# Patient Record
Sex: FEMALE | Race: WHITE | NOT HISPANIC OR LATINO | Employment: FULL TIME | URBAN - METROPOLITAN AREA
[De-identification: names, ages, dates, MRNs, and addresses within clinical notes are randomized per-mention and may not be internally consistent; named-entity substitution may affect disease eponyms.]

---

## 2017-01-03 ENCOUNTER — APPOINTMENT (OUTPATIENT)
Dept: PHYSICAL THERAPY | Facility: REHABILITATION | Age: 62
End: 2017-01-03
Payer: COMMERCIAL

## 2017-01-03 PROCEDURE — 97110 THERAPEUTIC EXERCISES: CPT

## 2017-01-03 PROCEDURE — 97140 MANUAL THERAPY 1/> REGIONS: CPT

## 2017-01-03 PROCEDURE — 97014 ELECTRIC STIMULATION THERAPY: CPT

## 2017-01-03 PROCEDURE — 97161 PT EVAL LOW COMPLEX 20 MIN: CPT

## 2017-01-06 ENCOUNTER — APPOINTMENT (OUTPATIENT)
Dept: PHYSICAL THERAPY | Facility: REHABILITATION | Age: 62
End: 2017-01-06
Payer: COMMERCIAL

## 2017-01-06 PROCEDURE — 97110 THERAPEUTIC EXERCISES: CPT

## 2017-01-06 PROCEDURE — 97140 MANUAL THERAPY 1/> REGIONS: CPT

## 2017-01-09 ENCOUNTER — APPOINTMENT (OUTPATIENT)
Dept: PHYSICAL THERAPY | Facility: REHABILITATION | Age: 62
End: 2017-01-09
Payer: COMMERCIAL

## 2017-01-09 PROCEDURE — 97110 THERAPEUTIC EXERCISES: CPT

## 2017-01-09 PROCEDURE — 97140 MANUAL THERAPY 1/> REGIONS: CPT

## 2017-01-13 ENCOUNTER — APPOINTMENT (OUTPATIENT)
Dept: PHYSICAL THERAPY | Facility: REHABILITATION | Age: 62
End: 2017-01-13
Payer: COMMERCIAL

## 2017-01-13 PROCEDURE — 97140 MANUAL THERAPY 1/> REGIONS: CPT

## 2017-01-13 PROCEDURE — 97110 THERAPEUTIC EXERCISES: CPT

## 2017-01-16 ENCOUNTER — APPOINTMENT (OUTPATIENT)
Dept: PHYSICAL THERAPY | Facility: REHABILITATION | Age: 62
End: 2017-01-16
Payer: COMMERCIAL

## 2017-01-16 PROCEDURE — 97110 THERAPEUTIC EXERCISES: CPT

## 2017-01-16 PROCEDURE — 97140 MANUAL THERAPY 1/> REGIONS: CPT

## 2017-01-18 ENCOUNTER — APPOINTMENT (OUTPATIENT)
Dept: PHYSICAL THERAPY | Facility: REHABILITATION | Age: 62
End: 2017-01-18
Payer: COMMERCIAL

## 2017-01-18 PROCEDURE — 97110 THERAPEUTIC EXERCISES: CPT

## 2017-01-18 PROCEDURE — 97140 MANUAL THERAPY 1/> REGIONS: CPT

## 2017-01-23 ENCOUNTER — APPOINTMENT (OUTPATIENT)
Dept: PHYSICAL THERAPY | Facility: REHABILITATION | Age: 62
End: 2017-01-23
Payer: COMMERCIAL

## 2017-01-23 PROCEDURE — 97110 THERAPEUTIC EXERCISES: CPT

## 2017-01-23 PROCEDURE — 97164 PT RE-EVAL EST PLAN CARE: CPT

## 2017-01-23 PROCEDURE — 97140 MANUAL THERAPY 1/> REGIONS: CPT

## 2017-01-25 ENCOUNTER — APPOINTMENT (OUTPATIENT)
Dept: PHYSICAL THERAPY | Facility: REHABILITATION | Age: 62
End: 2017-01-25
Payer: COMMERCIAL

## 2017-01-25 PROCEDURE — 97140 MANUAL THERAPY 1/> REGIONS: CPT

## 2017-01-25 PROCEDURE — 97110 THERAPEUTIC EXERCISES: CPT

## 2017-01-31 ENCOUNTER — APPOINTMENT (OUTPATIENT)
Dept: PHYSICAL THERAPY | Facility: REHABILITATION | Age: 62
End: 2017-01-31
Payer: COMMERCIAL

## 2017-01-31 PROCEDURE — 97140 MANUAL THERAPY 1/> REGIONS: CPT

## 2017-01-31 PROCEDURE — 97110 THERAPEUTIC EXERCISES: CPT

## 2017-01-31 PROCEDURE — G0283 ELEC STIM OTHER THAN WOUND: HCPCS

## 2017-01-31 PROCEDURE — 97014 ELECTRIC STIMULATION THERAPY: CPT

## 2017-02-02 ENCOUNTER — APPOINTMENT (OUTPATIENT)
Dept: PHYSICAL THERAPY | Facility: REHABILITATION | Age: 62
End: 2017-02-02
Payer: COMMERCIAL

## 2017-02-06 ENCOUNTER — APPOINTMENT (OUTPATIENT)
Dept: PHYSICAL THERAPY | Facility: REHABILITATION | Age: 62
End: 2017-02-06
Payer: COMMERCIAL

## 2017-02-06 PROCEDURE — G0283 ELEC STIM OTHER THAN WOUND: HCPCS

## 2017-02-06 PROCEDURE — 97014 ELECTRIC STIMULATION THERAPY: CPT

## 2017-02-06 PROCEDURE — 97110 THERAPEUTIC EXERCISES: CPT

## 2017-02-09 ENCOUNTER — APPOINTMENT (OUTPATIENT)
Dept: PHYSICAL THERAPY | Facility: REHABILITATION | Age: 62
End: 2017-02-09
Payer: COMMERCIAL

## 2017-02-16 ENCOUNTER — APPOINTMENT (OUTPATIENT)
Dept: PHYSICAL THERAPY | Facility: REHABILITATION | Age: 62
End: 2017-02-16
Payer: COMMERCIAL

## 2017-02-16 PROCEDURE — 97014 ELECTRIC STIMULATION THERAPY: CPT

## 2017-02-16 PROCEDURE — G0283 ELEC STIM OTHER THAN WOUND: HCPCS

## 2017-02-16 PROCEDURE — 97110 THERAPEUTIC EXERCISES: CPT

## 2017-11-14 ENCOUNTER — APPOINTMENT (OUTPATIENT)
Dept: PHYSICAL THERAPY | Facility: REHABILITATION | Age: 62
End: 2017-11-14
Payer: COMMERCIAL

## 2017-11-28 ENCOUNTER — APPOINTMENT (OUTPATIENT)
Dept: PHYSICAL THERAPY | Facility: REHABILITATION | Age: 62
End: 2017-11-28
Payer: COMMERCIAL

## 2017-11-28 PROCEDURE — G8985 CARRY GOAL STATUS: HCPCS

## 2017-11-28 PROCEDURE — 97140 MANUAL THERAPY 1/> REGIONS: CPT

## 2017-11-28 PROCEDURE — 97161 PT EVAL LOW COMPLEX 20 MIN: CPT

## 2017-11-28 PROCEDURE — 97110 THERAPEUTIC EXERCISES: CPT

## 2017-11-28 PROCEDURE — G8984 CARRY CURRENT STATUS: HCPCS

## 2017-12-05 ENCOUNTER — APPOINTMENT (OUTPATIENT)
Dept: PHYSICAL THERAPY | Facility: REHABILITATION | Age: 62
End: 2017-12-05
Payer: COMMERCIAL

## 2017-12-05 PROCEDURE — 97140 MANUAL THERAPY 1/> REGIONS: CPT

## 2017-12-12 ENCOUNTER — APPOINTMENT (OUTPATIENT)
Dept: PHYSICAL THERAPY | Facility: REHABILITATION | Age: 62
End: 2017-12-12
Payer: COMMERCIAL

## 2017-12-12 PROCEDURE — 97014 ELECTRIC STIMULATION THERAPY: CPT

## 2017-12-12 PROCEDURE — 97140 MANUAL THERAPY 1/> REGIONS: CPT

## 2017-12-12 PROCEDURE — G0283 ELEC STIM OTHER THAN WOUND: HCPCS

## 2017-12-19 ENCOUNTER — APPOINTMENT (OUTPATIENT)
Dept: PHYSICAL THERAPY | Facility: REHABILITATION | Age: 62
End: 2017-12-19
Payer: COMMERCIAL

## 2017-12-19 PROCEDURE — 97014 ELECTRIC STIMULATION THERAPY: CPT

## 2017-12-19 PROCEDURE — G0283 ELEC STIM OTHER THAN WOUND: HCPCS

## 2017-12-19 PROCEDURE — 97140 MANUAL THERAPY 1/> REGIONS: CPT

## 2017-12-26 ENCOUNTER — APPOINTMENT (OUTPATIENT)
Dept: PHYSICAL THERAPY | Facility: REHABILITATION | Age: 62
End: 2017-12-26
Payer: COMMERCIAL

## 2017-12-26 PROCEDURE — 97140 MANUAL THERAPY 1/> REGIONS: CPT

## 2017-12-26 PROCEDURE — 97014 ELECTRIC STIMULATION THERAPY: CPT

## 2017-12-26 PROCEDURE — G0283 ELEC STIM OTHER THAN WOUND: HCPCS

## 2017-12-28 ENCOUNTER — APPOINTMENT (OUTPATIENT)
Dept: PHYSICAL THERAPY | Facility: REHABILITATION | Age: 62
End: 2017-12-28
Payer: COMMERCIAL

## 2017-12-28 PROCEDURE — 97140 MANUAL THERAPY 1/> REGIONS: CPT

## 2017-12-28 PROCEDURE — 97014 ELECTRIC STIMULATION THERAPY: CPT

## 2017-12-28 PROCEDURE — G0283 ELEC STIM OTHER THAN WOUND: HCPCS

## 2018-01-09 ENCOUNTER — APPOINTMENT (OUTPATIENT)
Dept: PHYSICAL THERAPY | Facility: REHABILITATION | Age: 63
End: 2018-01-09
Payer: COMMERCIAL

## 2018-01-09 PROCEDURE — 97140 MANUAL THERAPY 1/> REGIONS: CPT

## 2018-01-18 ENCOUNTER — APPOINTMENT (OUTPATIENT)
Dept: PHYSICAL THERAPY | Facility: REHABILITATION | Age: 63
End: 2018-01-18
Payer: COMMERCIAL

## 2018-01-18 PROCEDURE — 97140 MANUAL THERAPY 1/> REGIONS: CPT

## 2018-01-24 ENCOUNTER — OFFICE VISIT (OUTPATIENT)
Dept: PHYSICAL THERAPY | Facility: REHABILITATION | Age: 63
End: 2018-01-24
Payer: COMMERCIAL

## 2018-01-24 DIAGNOSIS — M50.90 CERVICAL DISC DISEASE: Primary | ICD-10-CM

## 2018-01-24 PROCEDURE — 97140 MANUAL THERAPY 1/> REGIONS: CPT | Performed by: PHYSICAL THERAPIST

## 2018-01-24 NOTE — PROGRESS NOTES
Daily Note     Today's date: 2018  Patient name: Pieter Mustafa  : 1955  MRN: [id number]  Referring provider: Anoop Lenz  Dx:   Encounter Diagnosis   Name Primary?  Cervical disc disease Yes                  Subjective: Pt  Continues to have left upper trap pain - especially with cold weather  She reports more ease with turning her head now  Objective: Precautions: None     Daily Treatment Diary     Manual              STM c-spine x                                                                    Exercise Diary                                                                                                                                                                                                                                                                                      Modalities                                                               Assessment: Tolerated treatment well  Patient could benefit from continued PT      Plan: Continue per plan of care

## 2018-02-02 ENCOUNTER — OFFICE VISIT (OUTPATIENT)
Dept: PHYSICAL THERAPY | Facility: REHABILITATION | Age: 63
End: 2018-02-02
Payer: COMMERCIAL

## 2018-02-02 DIAGNOSIS — M50.90 CERVICAL DISC DISEASE: Primary | ICD-10-CM

## 2018-02-02 PROCEDURE — 97140 MANUAL THERAPY 1/> REGIONS: CPT | Performed by: PHYSICAL THERAPIST

## 2018-02-02 NOTE — PROGRESS NOTES
Daily Note     Today's date: 2018  Patient name: Dusty Roblero  : 1955  MRN: [id number]  Referring provider: Sukhdeep Vasquez  Dx:   Encounter Diagnosis   Name Primary?  Cervical disc disease Yes                   Subjective: Pt  Reporting burning pain at her cerviothoracic junction  Objective: See treatment diary below  Deep STM t/o cervical and upper thoracic region  Assessment:  Pt  Remarkably tight t/o her cervicothoracic region  Took a lot of work to loosen it up  Instructed her in self massage with a tennis ball  Plan: Continue with manual work prn

## 2018-02-07 ENCOUNTER — OFFICE VISIT (OUTPATIENT)
Dept: PHYSICAL THERAPY | Facility: REHABILITATION | Age: 63
End: 2018-02-07
Payer: COMMERCIAL

## 2018-02-07 DIAGNOSIS — M50.90 CERVICAL DISC DISEASE: Primary | ICD-10-CM

## 2018-02-07 PROCEDURE — 97140 MANUAL THERAPY 1/> REGIONS: CPT

## 2018-02-07 NOTE — PROGRESS NOTES
Daily Note     Today's date: 2018  Patient name: Imani Kunz  : 1955  MRN: [id number]  Referring provider: Vanessa Arboleda  Dx:   Encounter Diagnosis   Name Primary?  Cervical disc disease Yes                  Subjective: Increase in pain & spasm with increased stress of gravely ill parent  PRECAUTIONS:   none  Objective: See treatment diary below    Manual   2-2 2-7          STM c-spine x x x                                                                  Exercise Diary                                                                                                                                                                                                                                                                                      Modalities              Quad stim w MH x x x                                            Assessment: Tolerated treatment well  Patient would benefit from continued PT   Decreased pain & spasm after treatment      Plan: Continue per plan of care

## 2018-02-15 ENCOUNTER — OFFICE VISIT (OUTPATIENT)
Dept: PHYSICAL THERAPY | Facility: REHABILITATION | Age: 63
End: 2018-02-15
Payer: COMMERCIAL

## 2018-02-15 DIAGNOSIS — M54.12 CERVICAL RADICULOPATHY: Primary | ICD-10-CM

## 2018-02-15 PROCEDURE — 97140 MANUAL THERAPY 1/> REGIONS: CPT | Performed by: PHYSICAL THERAPIST

## 2018-02-15 NOTE — PROGRESS NOTES
Daily Note     Today's date: 2/15/2018  Patient name: Gee Nails  : 1955  MRN: [id number]  Referring provider: Josie Tabares  Dx:   Encounter Diagnosis   Name Primary?  Cervical radiculopathy Yes                  Subjective: Still getting a burning sensation at her upper thoracic region      Objective: See treatment diary below  Manual   2-2 2-7 2-15         STM c-spine x x x x                                                                 Exercise Diary                                                                                   Modalities              Quad stim w MH x x x x                                         Assessment: Remarkable tightness at upper thoracic paraspinals  Took a long time to break up the tightness  Plan: Plan to continue to break up tightness as needed

## 2018-02-21 ENCOUNTER — OFFICE VISIT (OUTPATIENT)
Dept: PHYSICAL THERAPY | Facility: REHABILITATION | Age: 63
End: 2018-02-21
Payer: COMMERCIAL

## 2018-02-21 DIAGNOSIS — M50.90 CERVICAL DISC DISEASE: Primary | ICD-10-CM

## 2018-02-21 PROCEDURE — 97140 MANUAL THERAPY 1/> REGIONS: CPT | Performed by: PHYSICAL THERAPIST

## 2018-02-21 NOTE — PROGRESS NOTES
Daily Note     Today's date: 2018  Patient name: Meenakshi Rodriguez  : 1955  MRN: [id number]  Referring provider: Nate Euceda  Dx:   Encounter Diagnosis     ICD-10-CM    1  Cervical disc disease M50 90                   Subjective:  Pt  Reporting that she was sore for two days after last session but felt that it was helpful  She was pain free for a couple of days  Objective: See treatment diary below  Manual   2-2 2-7 2-15 2-21        STM c-spine x x x x x                                                                Exercise Diary                                                                                   Modalities              Quad stim w MH x x x x                                         Assessment: Noticeable reduction of upper thoracic tightness today  Allowed me to work on her neck more  Painful with palpation right paraspinals but left side was tighter        Plan: Continue to work out tightness as indicated

## 2018-03-02 ENCOUNTER — OFFICE VISIT (OUTPATIENT)
Dept: PHYSICAL THERAPY | Facility: REHABILITATION | Age: 63
End: 2018-03-02
Payer: COMMERCIAL

## 2018-03-02 DIAGNOSIS — M50.90 CERVICAL DISC DISEASE: Primary | ICD-10-CM

## 2018-03-02 PROCEDURE — 97140 MANUAL THERAPY 1/> REGIONS: CPT | Performed by: PHYSICAL THERAPIST

## 2018-03-02 NOTE — PROGRESS NOTES
Daily Note     Today's date: 3/2/2018  Patient name: Onel Vasquez  : 1955  MRN: [id number]  Referring provider: Alcon Nelson  Dx:   Encounter Diagnosis     ICD-10-CM    1  Cervical disc disease M50 90                   Subjective: Still getting burning in upper thoracic but a little less  Her father passed away and so she did sit in a hospital chair all day yesterday        Objective: See treatment diary below  Manual   2-2 2-7 2-15 2-21 3-2       STM c-spine x x x x x x                                                               Exercise Diary                                                                                   Modalities              Quad stim w MH x x x x x x                                       Assessment: Tightness more focal to C-T junction and left sided cervical          Plan: Continue to work out tightness as indicated

## 2018-03-16 ENCOUNTER — APPOINTMENT (OUTPATIENT)
Dept: PHYSICAL THERAPY | Facility: REHABILITATION | Age: 63
End: 2018-03-16
Payer: COMMERCIAL

## 2018-03-19 ENCOUNTER — APPOINTMENT (OUTPATIENT)
Dept: PHYSICAL THERAPY | Facility: REHABILITATION | Age: 63
End: 2018-03-19
Payer: COMMERCIAL

## 2018-03-29 ENCOUNTER — APPOINTMENT (OUTPATIENT)
Dept: PHYSICAL THERAPY | Facility: REHABILITATION | Age: 63
End: 2018-03-29
Payer: COMMERCIAL

## 2018-04-03 ENCOUNTER — OFFICE VISIT (OUTPATIENT)
Dept: PHYSICAL THERAPY | Facility: REHABILITATION | Age: 63
End: 2018-04-03
Payer: COMMERCIAL

## 2018-04-03 DIAGNOSIS — M50.90 CERVICAL DISC DISEASE: Primary | ICD-10-CM

## 2018-04-03 PROCEDURE — 97140 MANUAL THERAPY 1/> REGIONS: CPT | Performed by: PHYSICAL THERAPIST

## 2018-04-03 NOTE — PROGRESS NOTES
Daily Note     Today's date: 4/3/2018  Patient name: Radha Willingham  : 1955  MRN: [id number]  Referring provider: Willy Salinas  Dx:   Encounter Diagnosis     ICD-10-CM    1  Cervical disc disease M50 90                   Subjective: Pt  Noting overall less upper thoracic stress with not having to care for her father  Objective: See treatment diary below  Manual   2-2 2-7 2-15 2-21 3-2 4-3      STM c-spine x x x x x x x                                                              Exercise Diary                                                                                   Modalities              Quad stim w MH x x x x x x x                                      Assessment: Increased lower cervical tightness noted but relaxed nicely after manual work         Plan: Continue to work out tightness as indicated

## 2018-04-24 ENCOUNTER — OFFICE VISIT (OUTPATIENT)
Dept: PHYSICAL THERAPY | Facility: REHABILITATION | Age: 63
End: 2018-04-24
Payer: COMMERCIAL

## 2018-04-24 DIAGNOSIS — M50.90 CERVICAL DISC DISEASE: Primary | ICD-10-CM

## 2018-04-24 PROCEDURE — 97140 MANUAL THERAPY 1/> REGIONS: CPT

## 2018-04-24 NOTE — PROGRESS NOTES
Daily Note     Today's date: 2018  Patient name: Damaris Nance  : 1955  MRN: [id number]  Referring provider: Alfreda Card  Dx:   Encounter Diagnosis     ICD-10-CM    1   Cervical disc disease M50 90                   Subjective: Pt reports she has not had her headset to use at work & neck has been very tight & painful, also into upper thoracic    Objective: See treatment diary below  Manual   2-2 2-7 2-15 2-21 3-2 4-3 -24     STM c-spine x x x x x x x X & thor & SOR                                                             Exercise Diary                                                                                   Modalities              Quad stim w MH x x x x x x x NT                                     Assessment: Good decrease in pain & tightness after treatment      Plan: Continue to work out tightness as indicated

## 2018-06-27 ENCOUNTER — EVALUATION (OUTPATIENT)
Dept: PHYSICAL THERAPY | Facility: REHABILITATION | Age: 63
End: 2018-06-27
Payer: COMMERCIAL

## 2018-06-27 DIAGNOSIS — M67.979 TIBIALIS POSTERIOR TENDINOPATHY: Primary | ICD-10-CM

## 2018-06-27 PROCEDURE — G8978 MOBILITY CURRENT STATUS: HCPCS | Performed by: PHYSICAL THERAPIST

## 2018-06-27 PROCEDURE — 97161 PT EVAL LOW COMPLEX 20 MIN: CPT | Performed by: PHYSICAL THERAPIST

## 2018-06-27 PROCEDURE — 97140 MANUAL THERAPY 1/> REGIONS: CPT | Performed by: PHYSICAL THERAPIST

## 2018-06-27 PROCEDURE — G8979 MOBILITY GOAL STATUS: HCPCS | Performed by: PHYSICAL THERAPIST

## 2018-06-27 NOTE — PROGRESS NOTES
PT Evaluation     Today's date: 2018  Patient name: Gaye Ross  : 1955  MRN: [id number]  Referring provider: Valdo Jackson DPM  Dx: No diagnosis found  Assessment  Impairments: abnormal coordination, abnormal gait, abnormal muscle firing, abnormal muscle tone, abnormal or restricted ROM, abnormal movement, activity intolerance, impaired balance, lacks appropriate home exercise program, pain with function and poor body mechanics    Assessment details: Gaye Ross is a 63 y o  female who presents with the above impairments d/t right ankle pain that has been persistent for approximately 1 month  Patient noticed the pain when she was on the elliptical machine and now feels medial right ankle discomfort with every step  Due to these impairments, patient has difficulty performing walking, squatting, and performing her usual workout routine without pain  Patient's clinical presentation is consistent with their referring diagnosis of posterior tibialis tendinopathy  Patient has been educated in  [address] and HEP  Patient would benefit from skilled physical therapy to address their aforementioned impairments, improve their level of function and to improve their overall quality of life     Understanding of Dx/Px/POC: good   Prognosis: good    Goals  Impairment Goals  - Pt I with initial HEP in 1-2 visits  - Improve ROM equal to contralateral side in 4-6 weeks  - Increase strength to 5/5 in all affected areas in 4-6 weeks    Functional Goals  - Increase Functional Status Measure to:  in 6-8 weeks  - Patient will be independent with comprehensive HEP in 6-8 weeks  - Ambulation is improved to prior level of function in 6-8 weeks  - Stair climbing is improved to prior level of function in 6-8 weeks  - Squatting is improved to prior level of function in 6-8 weeks    Plan  Patient would benefit from: PT eval  Planned modality interventions: H-Wave, thermotherapy: hydrocollator packs and cryotherapy  Planned therapy interventions: joint mobilization, manual therapy, massage, Jacobs taping, motor coordination training, muscle pump exercises, neuromuscular re-education, patient education, strengthening, stretching, therapeutic exercise, home exercise program, graded exercise, functional ROM exercises, flexibility, body mechanics training, balance/weight bearing training, balance, ADL retraining, activity modification and abdominal trunk stabilization  Frequency: 2x week  Duration in weeks: 12  Treatment plan discussed with: patient      Subjective Evaluation    History of Present Illness  Onset date: ~1 month ago  Mechanism of injury: Unkown NICANOR  Pain  Current pain ratin  At best pain ratin  At worst pain ratin  Quality: Stabbing  Relieving factors: ice  Aggravating factors: walking      Diagnostic Tests  X-ray: normal  Treatments  Previous treatment: medication (ibuprofen)  Current treatment: physical therapy  Patient Goals  Patient goals for therapy: decreased pain, improved balance, increased motion, increased strength, independence with ADLs/IADLs and return to sport/leisure activities        Objective     Static Posture     Ankle/Foot   Ankle/Foot (Left): Pes planus  Ankle/Foot (Right): Pronated  Observations     Right Ankle/Foot   Positive for edema  Additional Observation Details  Mild edema medial aspect of R ankle  Palpation     Right   Hypertonic in the posterior tibialis  Tenderness of the lateral gastrocnemius, medial gastrocnemius and posterior tibialis  Trigger point to medial gastrocnemius and posterior tibialis  Tenderness     Right Ankle/Foot   Tenderness in the posterior tibial tendon       Neurological Testing     Sensation     Ankle/Foot   Left Ankle/Foot   Intact: light touch    Right Ankle/Foot   Intact: light touch     Active Range of Motion   Left Hip   Normal active range of motion    Right Hip   Normal active range of motion  Left Ankle/Foot   Dorsiflexion (ke): 18 degrees   Plantar flexion: 60 degrees   Inversion: 40 degrees   Eversion: 50 degrees     Right Ankle/Foot   Dorsiflexion (ke): 10 degrees   Plantar flexion: 60 degrees   Inversion: 35 degrees   Eversion: 50 degrees     Passive Range of Motion     Right Ankle/Foot    Dorsiflexion (ke): 12 degrees   Inversion: 40 degrees     Joint Play     Right Ankle/Foot  Hypomobile in the talocrural joint and subtalar joint  Strength/Myotome Testing     Left Hip   Planes of Motion   Flexion: 5  Extension: 4  Adduction: 3+    Isolated Muscles   Gluteus medius: 3+    Right Hip   Planes of Motion   Flexion: 5  Extension: 3+  Adduction: 3+    Isolated Muscles   Gluteus medius: 3+    Left Ankle/Foot   Dorsiflexion: 5  Plantar flexion: 5  Inversion: 5  Eversion: 5  Great toe flexion: 3+  Great toe extension: 3+    Right Ankle/Foot   Dorsiflexion: 5  Plantar flexion: 3  Inversion: 3+  Eversion: 5  Great toe flexion: 3+  Great toe extension: 3+    Tests     Right Ankle/Foot   Negative for anterior drawer, eversion talar tilt, Homans' sign, inversion talar tilt and posterior drawer  Swelling   Left Ankle/Foot   Figure 8: 47 cm    Right Ankle/Foot   Figure 8: 48 cm    Ambulation     Quality of Movement During Gait     Foot Alignment    Foot Alignment (Left): Positive flat foot  Functional Assessment   Squat   Pain  Single Leg Squat   Left Leg  Unable to perform  Right Leg  Unable to perform        Single Leg Stance   Left: 10 seconds  Right: 5 seconds    Precautions: N/A     Daily Treatment Diary     Manual  6/27            STM/MFR 15'                                                                    Exercise Diary  6/27                         ABCs             Bridges GR 10x 10s            Clams GR 30x            Gastroc stretch long sit 3x30s Cristhian Najera, PT  6/27/2018,7:32 AM

## 2018-06-27 NOTE — LETTER
2018    Marvin Murillo, [address]    Patient: Greg Guerrier   YOB: 1955   Date of Visit: 2018     Encounter Diagnosis     ICD-10-CM    1  Tibialis posterior tendinopathy M67 979        Dear Dr Woody Macias:    Please review the attached Plan of Care from [facility] recent visit  Please verify that you agree therapy should continue by signing the attached document and sending it back to our office  If you have any questions or concerns, please don't hesitate to call  Sincerely,    Fabio Corado, PT      Referring Provider:      I certify that I have read the below Plan of Care and certify the need for these services furnished under this plan of treatment while under my care  SANDRA [address]: [phone]          PT Evaluation     Today's date: 2018  Patient name: Greg Guerrier  : 1955  MRN: [id number]  Referring provider: Rosalia Rivers DPM  Dx: No diagnosis found  Assessment  Impairments: abnormal coordination, abnormal gait, abnormal muscle firing, abnormal muscle tone, abnormal or restricted ROM, abnormal movement, activity intolerance, impaired balance, lacks appropriate home exercise program, pain with function and poor body mechanics    Assessment details: Greg Guerrier is a 63 y o  female who presents with the above impairments d/t right ankle pain that has been persistent for approximately 1 month  Patient noticed the pain when she was on the elliptical machine and now feels medial right ankle discomfort with every step  Due to these impairments, patient has difficulty performing walking, squatting, and performing her usual workout routine without pain  Patient's clinical presentation is consistent with their referring diagnosis of posterior tibialis tendinopathy   Patient has been educated in POC and HEP  Patient would benefit from skilled physical therapy to address their aforementioned impairments, improve their level of function and to improve their overall quality of life  Understanding of Dx/Px/POC: good   Prognosis: good    Goals  Impairment Goals  - Pt I with initial HEP in 1-2 visits  - Improve ROM equal to contralateral side in 4-6 weeks  - Increase strength to 5/5 in all affected areas in 4-6 weeks    Functional Goals  - Increase Functional Status Measure to:  in 6-8 weeks  - Patient will be independent with comprehensive HEP in 6-8 weeks  - Ambulation is improved to prior level of function in 6-8 weeks  - Stair climbing is improved to prior level of function in 6-8 weeks  - Squatting is improved to prior level of function in 6-8 weeks    Plan  Patient would benefit from: PT eval  Planned modality interventions: H-Wave, thermotherapy: hydrocollator packs and cryotherapy  Planned therapy interventions: joint mobilization, manual therapy, massage, Jacobs taping, motor coordination training, muscle pump exercises, neuromuscular re-education, patient education, strengthening, stretching, therapeutic exercise, home exercise program, graded exercise, functional ROM exercises, flexibility, body mechanics training, balance/weight bearing training, balance, ADL retraining, activity modification and abdominal trunk stabilization  Frequency: 2x week  Duration in weeks: 12  Treatment plan discussed with: patient      Subjective Evaluation    History of Present Illness  Onset date: ~1 month ago  Mechanism of injury: Unkown NICANOR  Pain  Current pain ratin  At best pain ratin  At worst pain ratin  Quality: Stabbing    Relieving factors: ice  Aggravating factors: walking      Diagnostic Tests  X-ray: normal  Treatments  Previous treatment: medication (ibuprofen)  Current treatment: physical therapy  Patient Goals  Patient goals for therapy: decreased pain, improved balance, increased motion, increased strength, independence with ADLs/IADLs and return to sport/leisure activities        Objective     Static Posture     Ankle/Foot   Ankle/Foot (Left): Pes planus  Ankle/Foot (Right): Pronated  Observations     Right Ankle/Foot   Positive for edema  Additional Observation Details  Mild edema medial aspect of R ankle  Palpation     Right   Hypertonic in the posterior tibialis  Tenderness of the lateral gastrocnemius, medial gastrocnemius and posterior tibialis  Trigger point to medial gastrocnemius and posterior tibialis  Tenderness     Right Ankle/Foot   Tenderness in the posterior tibial tendon  Neurological Testing     Sensation     Ankle/Foot   Left Ankle/Foot   Intact: light touch    Right Ankle/Foot   Intact: light touch     Active Range of Motion   Left Hip   Normal active range of motion    Right Hip   Normal active range of motion  Left Ankle/Foot   Dorsiflexion (ke): 18 degrees   Plantar flexion: 60 degrees   Inversion: 40 degrees   Eversion: 50 degrees     Right Ankle/Foot   Dorsiflexion (ke): 10 degrees   Plantar flexion: 60 degrees   Inversion: 35 degrees   Eversion: 50 degrees     Passive Range of Motion     Right Ankle/Foot    Dorsiflexion (ke): 12 degrees   Inversion: 40 degrees     Joint Play     Right Ankle/Foot  Hypomobile in the talocrural joint and subtalar joint       Strength/Myotome Testing     Left Hip   Planes of Motion   Flexion: 5  Extension: 4  Adduction: 3+    Isolated Muscles   Gluteus medius: 3+    Right Hip   Planes of Motion   Flexion: 5  Extension: 3+  Adduction: 3+    Isolated Muscles   Gluteus medius: 3+    Left Ankle/Foot   Dorsiflexion: 5  Plantar flexion: 5  Inversion: 5  Eversion: 5  Great toe flexion: 3+  Great toe extension: 3+    Right Ankle/Foot   Dorsiflexion: 5  Plantar flexion: 3  Inversion: 3+  Eversion: 5  Great toe flexion: 3+  Great toe extension: 3+    Tests     Right Ankle/Foot   Negative for anterior drawer, eversion talar tilt, Nubia Nunot' sign, inversion talar tilt and posterior drawer  Swelling   Left Ankle/Foot   Figure 8: 47 cm    Right Ankle/Foot   Figure 8: 48 cm    Ambulation     Quality of Movement During Gait     Foot Alignment    Foot Alignment (Left): Positive flat foot  Functional Assessment   Squat   Pain  Single Leg Squat   Left Leg  Unable to perform  Right Leg  Unable to perform        Single Leg Stance   Left: 10 seconds  Right: 5 seconds    Precautions: N/A     Daily Treatment Diary     Manual  6/27            STM/MFR 15'                                                                    Exercise Diary  6/27                         ABCs             Bridges GR 10x 10s            Clams GR 30x            Gastroc stretch long sit 3x30s                                                                                                                                                                                                                   Modalities              Anali Campos, PT  6/27/2018,7:32 AM

## 2018-06-28 ENCOUNTER — TRANSCRIBE ORDERS (OUTPATIENT)
Dept: PHYSICAL THERAPY | Facility: REHABILITATION | Age: 63
End: 2018-06-28

## 2018-06-28 DIAGNOSIS — M67.979 TIBIALIS POSTERIOR TENDINOPATHY: Primary | ICD-10-CM

## 2018-07-02 ENCOUNTER — OFFICE VISIT (OUTPATIENT)
Dept: PHYSICAL THERAPY | Facility: REHABILITATION | Age: 63
End: 2018-07-02
Payer: COMMERCIAL

## 2018-07-02 DIAGNOSIS — M50.90 CERVICAL DISC DISEASE: ICD-10-CM

## 2018-07-02 DIAGNOSIS — M67.979 TIBIALIS POSTERIOR TENDINOPATHY: Primary | ICD-10-CM

## 2018-07-02 PROCEDURE — 97140 MANUAL THERAPY 1/> REGIONS: CPT

## 2018-07-02 PROCEDURE — 97014 ELECTRIC STIMULATION THERAPY: CPT

## 2018-07-02 PROCEDURE — 97112 NEUROMUSCULAR REEDUCATION: CPT

## 2018-07-02 PROCEDURE — G0283 ELEC STIM OTHER THAN WOUND: HCPCS

## 2018-07-02 NOTE — PROGRESS NOTES
Daily Note     Today's date: 2018  Patient name: Leon Zamora  : 1955  MRN: [id number]  Referring provider: Tristen Hernandez DPM  Dx:   Encounter Diagnosis     ICD-10-CM    1  Tibialis posterior tendinopathy M67 979    2  Cervical disc disease M50 90                   Subjective:  Cont to be very tender & painful jose post tib  Objective: See treatment diary below  Manual   7-2           STM/MFR 15' x                                                                   Exercise Diary                           ABCs  x           Bridges GR 10x 10s x           Clams GR 30x x           Gastroc stretch long sit 3x30s x                                                                                                                                                                                                                  Modalities              Hwave 15' 20'                                       X=same as last time                Assessment: Tolerated treatment well  Patient would benefit from continued PT  Hypersensitive medial calf  Trial of D tubigrip      Plan: Continue per plan of care

## 2018-07-05 ENCOUNTER — OFFICE VISIT (OUTPATIENT)
Dept: PHYSICAL THERAPY | Facility: REHABILITATION | Age: 63
End: 2018-07-05
Payer: COMMERCIAL

## 2018-07-05 DIAGNOSIS — M67.979 TIBIALIS POSTERIOR TENDINOPATHY: Primary | ICD-10-CM

## 2018-07-05 PROCEDURE — 97112 NEUROMUSCULAR REEDUCATION: CPT

## 2018-07-05 PROCEDURE — 97140 MANUAL THERAPY 1/> REGIONS: CPT

## 2018-07-05 NOTE — PROGRESS NOTES
Daily Note     Today's date: 2018  Patient name: Ana Olsen  : 1955  MRN: [id number]  Referring provider: Dajuan Blevins DPM  Dx:   Encounter Diagnosis     ICD-10-CM    1  Tibialis posterior tendinopathy M67 979                   Subjective:  Tubigrip helps but cont to have pain jose with WB      Objective: See treatment diary below  Manual   7-2 7-5          STM/MFR 15' x x                                                                  Exercise Diary                           ABCs  x x          Bridges GR 10x 10s x x          Clams GR 30x x x          Gastroc stretch long sit 3x30s x x                                                                                                                                                                                                                 Modalities              Hwave 15' 20'                                       X=same as last time                Assessment: Tolerated treatment well  Patient would benefit from continued PT  Not as sensitive in calf  PT advised to hold until she sees MD Tuesday      Plan: Continue per plan of care

## 2018-07-09 ENCOUNTER — APPOINTMENT (OUTPATIENT)
Dept: PHYSICAL THERAPY | Facility: REHABILITATION | Age: 63
End: 2018-07-09
Payer: COMMERCIAL

## 2018-07-12 ENCOUNTER — APPOINTMENT (OUTPATIENT)
Dept: PHYSICAL THERAPY | Facility: REHABILITATION | Age: 63
End: 2018-07-12
Payer: COMMERCIAL

## 2019-10-16 ENCOUNTER — EVALUATION (OUTPATIENT)
Dept: PHYSICAL THERAPY | Facility: REHABILITATION | Age: 64
End: 2019-10-16
Payer: COMMERCIAL

## 2019-10-16 DIAGNOSIS — M54.16 LUMBAR RADICULOPATHY: Primary | ICD-10-CM

## 2019-10-16 PROCEDURE — 97140 MANUAL THERAPY 1/> REGIONS: CPT | Performed by: PHYSICAL THERAPIST

## 2019-10-16 PROCEDURE — 97162 PT EVAL MOD COMPLEX 30 MIN: CPT | Performed by: PHYSICAL THERAPIST

## 2019-10-16 PROCEDURE — 97110 THERAPEUTIC EXERCISES: CPT | Performed by: PHYSICAL THERAPIST

## 2019-10-16 NOTE — PROGRESS NOTES
PT Evaluation     Today's date: 10/16/2019  Patient name: Ervin House  : 1955  MRN: [id number]  Referring provider: Anjelica Gonzales PT  Dx:   Encounter Diagnosis     ICD-10-CM    1  Lumbar radiculopathy M54 16                   Assessment  Assessment details: Pt is a 59 yo female experiencing an acute episode of left low back pain likely from packing to move  She presents with moderate lumbar spasm and limited mobility into extension  She has a history of LBP and left TKR  She presents today with severe right sided low back pain and limited lumbar mobility limiting her functional activities  Packing for upcoming move may be cause of exacerbation of LBP  She does have some positive SI joint signs and may need to have this addressed along with lumbar derangement  Flexion rotation to the left and prone press ups but reduced symptoms and she was advised to do both frequently at home  Impairments: abnormal muscle tone, abnormal or restricted ROM, activity intolerance, pain with function and poor body mechanics    Symptom irritability: highUnderstanding of Dx/Px/POC: excellent  Goals  STG: in 2 weeks  Decrease pain to 5/10 at most  Centralize pain  Pt able to walk on Treadmill 15 min without pain  LTG: by time of D/C  Improve FOTO score to 73  Full painfree lumbar ROM  Able to resume fitness program  Packing without pain  Plan  Patient would benefit from: skilled physical therapy  Planned modality interventions: TENS and thermotherapy: hydrocollator packs  Planned therapy interventions: joint mobilization, manual therapy, patient education, strengthening, stretching, therapeutic exercise, graded motor and home exercise program  Frequency: 2x week  Duration in weeks: 12  Treatment plan discussed with: patient        Subjective Evaluation    History of Present Illness  Mechanism of injury: LBP began last Tuesday  She has a new bed that she thinks may be causing it  Pain is intermittent    Had a weird pain in her leg for a couple months and thought it might have been from knee  Pain  Current pain ratin  At best pain ratin  At worst pain ratin  Quality: burning  Progression: no change    Social Support  Lives in: apartment    Patient Goals  Patient goals for therapy: decreased pain and independence with ADLs/IADLs  Patient goal: Be able to pack for moving  Objective     Palpation   Left   Muscle spasm in the erector spinae and lumbar paraspinals  Tenderness of the erector spinae and lumbar paraspinals  Neurological Testing     Sensation     Lumbar   Left   Intact: light touch    Right   Intact: light touch    Reflexes   Left   Patellar (L4): normal (2+)  Achilles (S1): normal (2+)    Right   Patellar (L4): normal (2+)  Achilles (S1): normal (2+)    Active Range of Motion     Lumbar   Flexion:  WFL  Extension:  Restriction level: minimal  Mechanical Assessment    Cervical      Thoracic      Lumbar    Sitting flexion: repeated movements  Pain location: no change  Standing extension: repeated movements  Pain location: no change  Lying extension: repeated movements  Pain intensity: better    Strength/Myotome Testing     Lumbar   Left   Normal strength    Right   Normal strength    Tests     Lumbar   Positive sacral spring   Negative SIJ compression and sacroiliac distraction  Left   Positive passive SLR and slump test      Right   Negative crossed SLR and passive SLR  Right Pelvic Girdle/Sacrum   Positive: thigh thrust      Left Hip   Positive FADIR  Right Hip   Positive long sit  Additional Tests Details  Flexion rotation to the left reduced and abolished pain  After 10 min of standing pain returned      Long sit test: right long-> longer        Flowsheet Rows      Most Recent Value   PT/OT G-Codes   Current Score  52   Projected Score  73             Precautions: L TKA      Manual  10/16            PA and unilat L3,4,5 Gr  2            Brief STM L LB 5 min Flexion rotation stretch knees left 3x5                                          Exercise Diary              Prone press ups last set w/exhale and sag 3x10                                                                                                                                                                                                                                                                        Modalities              MH with TENS 10 min

## 2019-10-17 ENCOUNTER — OFFICE VISIT (OUTPATIENT)
Dept: PHYSICAL THERAPY | Facility: REHABILITATION | Age: 64
End: 2019-10-17
Payer: COMMERCIAL

## 2019-10-17 DIAGNOSIS — M54.16 LUMBAR RADICULOPATHY: Primary | ICD-10-CM

## 2019-10-17 PROCEDURE — 97112 NEUROMUSCULAR REEDUCATION: CPT | Performed by: PHYSICAL THERAPIST

## 2019-10-17 PROCEDURE — 97014 ELECTRIC STIMULATION THERAPY: CPT | Performed by: PHYSICAL THERAPIST

## 2019-10-17 PROCEDURE — G0283 ELEC STIM OTHER THAN WOUND: HCPCS | Performed by: PHYSICAL THERAPIST

## 2019-10-17 PROCEDURE — 97140 MANUAL THERAPY 1/> REGIONS: CPT | Performed by: PHYSICAL THERAPIST

## 2019-10-17 NOTE — PROGRESS NOTES
Daily Note     Today's date: 10/17/2019  Patient name: Mars Curiel  : 1955  MRN: [id number]  Referring provider: Nikhil Garza PT  Dx:   Encounter Diagnosis     ICD-10-CM    1  Lumbar radiculopathy M54 16                   Subjective: Pt reports "I feel so much better than I did yesterday  I only have a little pain in my left lower back, nothing down my leg anymore  "She rates her pain at 1-2/10 prior to session today  Objective: See treatment diary  Precautions: L TKA      Assessment: Pt with good response with implementation of extension based program with centralization of symptoms  Pt with good tolerance to manual techniques and addition of standing lumbar extension and piriformis stretch, noting decreased pain with completion of session  Pt will benefit from continued skilled PT intervention in order to address her remaining limitations and to restore maximal function  Plan: Continue per plan of care

## 2019-10-22 ENCOUNTER — APPOINTMENT (OUTPATIENT)
Dept: PHYSICAL THERAPY | Facility: REHABILITATION | Age: 64
End: 2019-10-22
Payer: COMMERCIAL

## 2019-10-23 ENCOUNTER — OFFICE VISIT (OUTPATIENT)
Dept: PHYSICAL THERAPY | Facility: REHABILITATION | Age: 64
End: 2019-10-23
Payer: COMMERCIAL

## 2019-10-23 DIAGNOSIS — M54.16 LUMBAR RADICULOPATHY: Primary | ICD-10-CM

## 2019-10-23 PROCEDURE — 97110 THERAPEUTIC EXERCISES: CPT | Performed by: PHYSICAL THERAPIST

## 2019-10-23 PROCEDURE — 97140 MANUAL THERAPY 1/> REGIONS: CPT | Performed by: PHYSICAL THERAPIST

## 2019-10-23 NOTE — PROGRESS NOTES
Daily Note     Today's date: 10/23/2019  Patient name: Cheyanne Noel  : 1955  MRN: [id number]  Referring provider: Willy Driver, PT  Dx:   Encounter Diagnosis     ICD-10-CM    1  Lumbar radiculopathy M54 16                   Subjective: Pt had a good day yesterday but then awoke with pain rated 4/10 today  Pain radiating into her calf  Objective: See treatment diary  Repeated extension in standing centralized pain to left low back and lowered it to 3/10  Prone press ups with  Brought pain down to a 2/10  Exhale and sag increased pain and unilat glides brought pain back down to 2/10 Press ups in shift abolished pain  Pt walked about 2 5 minutes on treadmill and pain returned  Pt able to centralize with standing extension and it abolished again with prone press ups with shift and overpressure  Able to walk for 5 min on treadmill at 1 5   Precautions: L TKA      Manual  10/16 10/17 10/23          PA and unilat L3,4,5 Gr  2  NT          Brief STM L LB 5 min  NT          Flexion rotation stretch knees left 3x5  2x with no effect          Tape I strips   NT                           Exercise Diary              Prone press ups last set w/exhale and sag 3x10   3x10          Press ups in side glide/hips right   2x10          Standing extension   4x10                                                                                                                                                                                                                                           Modalities              MH with TENS 10 min                                             Assessment: Pt has not been performing a sufficient amount of extension to maintain reduction of pain  She has also not been perfect with her body mechanics  She was able to abolish her pain in the clinic  She would benefit from continued PT to insure that she has the appropriate exercises and body mechanics to maintain the reduction  Plan: Continue per plan of care       Precautions: L TKA

## 2019-10-25 ENCOUNTER — OFFICE VISIT (OUTPATIENT)
Dept: PHYSICAL THERAPY | Facility: REHABILITATION | Age: 64
End: 2019-10-25
Payer: COMMERCIAL

## 2019-10-25 DIAGNOSIS — M54.16 LUMBAR RADICULOPATHY: Primary | ICD-10-CM

## 2019-10-25 PROCEDURE — 97140 MANUAL THERAPY 1/> REGIONS: CPT | Performed by: PHYSICAL THERAPIST

## 2019-10-25 PROCEDURE — 97112 NEUROMUSCULAR REEDUCATION: CPT | Performed by: PHYSICAL THERAPIST

## 2019-10-25 PROCEDURE — 97110 THERAPEUTIC EXERCISES: CPT | Performed by: PHYSICAL THERAPIST

## 2019-10-25 NOTE — PROGRESS NOTES
Daily Note     Today's date: 10/25/2019  Patient name: Lorenzo Villa  : 1955  MRN: [id number]  Referring provider: Dagmar Mortimer, PT  Dx:   Encounter Diagnosis     ICD-10-CM    1  Lumbar radiculopathy M54 16                   Subjective: Pain comes and goes  She is able to almost fully reduce it with prone extension but then is comes back with sitting or standing  Objective: See treatment diary  Precautions: L TKA      Manual  10/16 10/17 10/23 10/25         PA and unilat L3,4,5 Gr  2  NT NT         Brief STM L LB 5 min  NT NT         Flexion rotation stretch knees left 3x5  2x with no effect          Tape I strips   NT  and star over TP                          Exercise Diary              Prone press ups last set w/exhale and sag 3x10   3x10 Last set with OP         Press ups in side glide/hips right   2x10          Standing extension   4x10                                                                                                                                                                                                                                           Modalities              MH with TENS 10 min                                             Assessment: Pt is very tender over the paraspinals at L3-4 on the left  Extension reduces pain but it does not stay reduced  Added overpressure today to aid with maintenance of reduction  After this she was able to walk for 10 min at 1 9 without pain  Added more tape to help with posture and to support painful region and tought patient how to do self overpressure with an ironing board and belt  Pt needs more pressure applied frequently to help maintain reduction  Plan: Continue per plan of care       Precautions: L TKA

## 2019-10-29 ENCOUNTER — OFFICE VISIT (OUTPATIENT)
Dept: PHYSICAL THERAPY | Facility: REHABILITATION | Age: 64
End: 2019-10-29
Payer: COMMERCIAL

## 2019-10-29 DIAGNOSIS — M54.16 LUMBAR RADICULOPATHY: Primary | ICD-10-CM

## 2019-10-29 PROCEDURE — 97112 NEUROMUSCULAR REEDUCATION: CPT | Performed by: PHYSICAL THERAPIST

## 2019-10-29 PROCEDURE — 97140 MANUAL THERAPY 1/> REGIONS: CPT | Performed by: PHYSICAL THERAPIST

## 2019-10-29 PROCEDURE — 97110 THERAPEUTIC EXERCISES: CPT | Performed by: PHYSICAL THERAPIST

## 2019-10-29 NOTE — PROGRESS NOTES
Daily Note     Today's date: 10/29/2019  Patient name: Sadiq Paredes  : 1955  MRN: [id number]  Referring provider: Guerita Rebolledo, PT  Dx:   Encounter Diagnosis     ICD-10-CM    1  Lumbar radiculopathy M54 16               Subjective: Pain comes and goes  She is able to almost fully reduce it with prone extension but then is comes back with sitting or standing  Objective: See treatment diary  Assessment: No success with significant extension in prone and standing  Trial of side glides to left but lateral leg pain returned  Trial of right side glides appeared to resolve leg pain  Pt  Was instructed to perform these first thing in AM to see how she reacts  Plan: Continue per plan of care       Precautions: L TKA      Manual  10/16 10/17 10/23 10/25 10/29         PA and unilat L3,4,5 Gr  2  NT NT MW        Brief STM L LB 5 min  NT NT MW        Flexion rotation stretch knees left 3x5  2x with no effect          Tape I strips   NT  and star over TP MW                         Exercise Diary              Prone press ups last set w/exhale and sag 3x10   3x10 Last set with OP x        Press ups in side glide/hips right   2x10          Standing extension   4x10  30x                     Side glides right      3x10                                                                                                                                                                                                               Modalities              MH with TENS 10 min

## 2019-10-31 ENCOUNTER — OFFICE VISIT (OUTPATIENT)
Dept: PHYSICAL THERAPY | Facility: REHABILITATION | Age: 64
End: 2019-10-31
Payer: COMMERCIAL

## 2019-10-31 DIAGNOSIS — M54.16 LUMBAR RADICULOPATHY: Primary | ICD-10-CM

## 2019-10-31 PROCEDURE — 97112 NEUROMUSCULAR REEDUCATION: CPT | Performed by: PHYSICAL THERAPIST

## 2019-10-31 PROCEDURE — 97110 THERAPEUTIC EXERCISES: CPT | Performed by: PHYSICAL THERAPIST

## 2019-10-31 PROCEDURE — 97140 MANUAL THERAPY 1/> REGIONS: CPT | Performed by: PHYSICAL THERAPIST

## 2019-11-14 ENCOUNTER — OFFICE VISIT (OUTPATIENT)
Dept: PHYSICAL THERAPY | Facility: REHABILITATION | Age: 64
End: 2019-11-14
Payer: COMMERCIAL

## 2019-11-14 DIAGNOSIS — M54.16 LUMBAR RADICULOPATHY: Primary | ICD-10-CM

## 2019-11-14 PROCEDURE — 97140 MANUAL THERAPY 1/> REGIONS: CPT | Performed by: PHYSICAL THERAPIST

## 2019-11-14 PROCEDURE — 97112 NEUROMUSCULAR REEDUCATION: CPT | Performed by: PHYSICAL THERAPIST

## 2019-11-14 PROCEDURE — 97110 THERAPEUTIC EXERCISES: CPT | Performed by: PHYSICAL THERAPIST

## 2019-11-14 NOTE — PROGRESS NOTES
Daily Note     Today's date: 2019  Patient name: Vivek Garcia  : 1955  MRN: [id number]  Referring provider: Karla Currie PT  Dx:   Encounter Diagnosis     ICD-10-CM    1  Lumbar radiculopathy M54 16        Subjective: Sitting on a harder chair and in the car she is noting some thoracic pain  Back pain is staying more centralized  Objective: See treatment diary  Assessment: With improved posture pt is having less leg pain  Used Leukotape for lumbar support today to further reinforce that  Still quite tender over her piriformis  SLR is now negative  Plan: Continue per plan of care       Precautions: L TKA      Manual  10/16 10/17 10/23 10/25 10/29  10/31 11/14      PA and unilat L3,4,5 Gr  2  NT NT MW MW NT      STM L LB and piriformis 5 min  NT NT MW MW NT      Flexion rotation stretch knees left 3x5  2x with no effect          Tape I strips   NT  and star over TP MW MW X over LB                     Exercise Diary              Elliptical     10 min 10 min 10 min      Prone press ups last set w/exhale and sag 3x10   3x10 Last set with OP x  4x10      Press ups in side glide/hips right   2x10          Standing extension   4x10  30x x x      90/90 w/ankle pump       2x10      Side glides right      3x10                                                                                                                                                                                                               Modalities              MH with TENS 10 min

## 2019-11-20 ENCOUNTER — OFFICE VISIT (OUTPATIENT)
Dept: PHYSICAL THERAPY | Facility: REHABILITATION | Age: 64
End: 2019-11-20
Payer: COMMERCIAL

## 2019-11-20 DIAGNOSIS — M54.16 LUMBAR RADICULOPATHY: Primary | ICD-10-CM

## 2019-11-20 PROCEDURE — 97140 MANUAL THERAPY 1/> REGIONS: CPT | Performed by: PHYSICAL THERAPIST

## 2019-11-20 PROCEDURE — 97112 NEUROMUSCULAR REEDUCATION: CPT | Performed by: PHYSICAL THERAPIST

## 2019-11-20 NOTE — PROGRESS NOTES
Daily Note     Today's date: 2019  Patient name: Aide Roa  : 1955  MRN: [id number]  Referring provider: Timothy Bosworth, PT  Dx:   Encounter Diagnosis     ICD-10-CM    1  Lumbar radiculopathy M54 16        Subjective: Had to move furniture and lots of boxes so feeling left lateral leg pain off and one  Not a lot of room to do exercises - everything is covered with boxes  Objective: See treatment diary  Assessment: Able to abolish leg and back pain with STM and press ups  Instructed again to keep a good lordotic lumbar curve with lifting  Added Rock tape over [employer] tape as the tape was helpful and she likes the rock tape as well  Plan: Continue per plan of care       Precautions: L TKA      Manual  10/16 10/17 10/23 10/25 10/29  10/31 11/14 11/20     PA and unilat L3,4,5 Gr  2  NT NT MW MW NT MW     STM L LB and piriformis 5 min  NT NT MW MW NT MW     Flexion rotation stretch knees left 3x5  2x with no effect          Tape I strips   NT  and star over TP MW MW X over LB MW     Jacobs X tape lumbar spine        MW       Exercise Diary              Elliptical     10 min 10 min 10 min      Prone press ups last set w/exhale and sag 3x10   3x10 Last set with OP x  4x10      Press ups in side glide/hips right   2x10          Standing extension   4x10  30x x       90/90 w/ankle pump       2x10      Side glides right      3x10                                                                                                                                                                                                               Modalities              MH with TENS 10 min

## 2019-11-25 ENCOUNTER — OFFICE VISIT (OUTPATIENT)
Dept: PHYSICAL THERAPY | Facility: REHABILITATION | Age: 64
End: 2019-11-25
Payer: COMMERCIAL

## 2019-11-25 DIAGNOSIS — M54.16 LUMBAR RADICULOPATHY: Primary | ICD-10-CM

## 2019-11-25 PROCEDURE — 97110 THERAPEUTIC EXERCISES: CPT | Performed by: PHYSICAL THERAPIST

## 2019-11-25 PROCEDURE — 97140 MANUAL THERAPY 1/> REGIONS: CPT | Performed by: PHYSICAL THERAPIST

## 2019-11-25 PROCEDURE — 97112 NEUROMUSCULAR REEDUCATION: CPT | Performed by: PHYSICAL THERAPIST

## 2019-11-25 NOTE — PROGRESS NOTES
Daily Note     Today's date: 2019  Patient name: Aide Roa  : 1955  MRN: [id number]  Referring provider: Timothy Bosworth, PT  Dx:   Encounter Diagnosis     ICD-10-CM    1  Lumbar radiculopathy M54 16        Subjective: Pt reports tender spots located near her thoracic spine and in left buttock  Pt says that because of her home exercise program, she understands how she can relieve her back pain at home  Pt says that she rarely has LBP at rest     Objective: See treatment diary  Assessment: Pt responded well to [facility] and trigger point release  Pt used  at beginning of session because of tender points in her back  Pt was instructed on postural exercises she can perform while driving  Pt performed cross arm stretch to relieve pain from thoracic trigger point       Plan: Discharge    Precautions: L TKA      Manual  10/16 10/17 10/23 10/25 10/29  10/31 11/14 11/20 11/25    PA and unilat L3,4,5 Gr  2  NT NT MW MW NT MW DK    STM L LB and piriformis 5 min  NT NT MW MW NT MW NT    Flexion rotation stretch knees left 3x5  2x with no effect          Tape I strips   NT  and star over TP MW MW X over LB MW     Jacobs X tape lumbar spine        MW NT      Exercise Diary              Elliptical     10 min 10 min 10 min  10 min    Prone press ups last set w/exhale and sag 3x10   3x10 Last set with OP x  4x10  3x10  Last set with OP    Press ups in side glide/hips right   2x10          Standing extension   4x10  30x x       90/90 w/ankle pump       2x10      Side glides right      3x10        Piriformis stretch         3x30"    Cross arm stretch         30"                                                                                                                                                                                 Modalities              MH with TENS 10 min            MH         10 min

## 2023-06-06 ENCOUNTER — EVALUATION (OUTPATIENT)
Dept: PHYSICAL THERAPY | Facility: REHABILITATION | Age: 68
End: 2023-06-06
Payer: COMMERCIAL

## 2023-06-06 DIAGNOSIS — M25.561 RIGHT KNEE PAIN, UNSPECIFIED CHRONICITY: Primary | ICD-10-CM

## 2023-06-06 DIAGNOSIS — Z96.651 STATUS POST RIGHT KNEE REPLACEMENT: ICD-10-CM

## 2023-06-06 PROCEDURE — 97530 THERAPEUTIC ACTIVITIES: CPT | Performed by: PHYSICAL THERAPIST

## 2023-06-06 PROCEDURE — 97161 PT EVAL LOW COMPLEX 20 MIN: CPT | Performed by: PHYSICAL THERAPIST

## 2023-06-06 PROCEDURE — 97110 THERAPEUTIC EXERCISES: CPT | Performed by: PHYSICAL THERAPIST

## 2023-06-06 RX ORDER — LOSARTAN POTASSIUM 25 MG/1
25 TABLET ORAL DAILY
COMMUNITY

## 2023-06-06 RX ORDER — ROSUVASTATIN CALCIUM 10 MG/1
10 TABLET, COATED ORAL DAILY
COMMUNITY

## 2023-06-06 NOTE — LETTER
2023    Ted Turcios, [facility], [address] [place] [id number]    Patient: Jonah Davis   YOB: 1955   Date of Visit: 2023     Encounter Diagnosis     ICD-10-CM    1  Right knee pain, unspecified chronicity  M25 561       2  Status post right knee replacement  Z96 651           Dear Dr Judah Cheung: Thank you for your recent referral of Jonah Davis  Please review the attached evaluation summary from Brisa's recent visit  Please verify that you agree with the plan of care by signing the attached order  If you have any questions or concerns, please do not hesitate to call  I sincerely appreciate the opportunity to share in the care of one of your patients and hope to have another opportunity to work with you in the near future  Sincerely,    Carlos Larson, PT      Referring Provider:      I certify that I have read the below Plan of Care and certify the need for these services furnished under this plan of treatment while under my care  Ted Turcios MD  [address] [address]  Via Fax: [phone]          PT Evaluation     Today's date: 2023  Patient name: Jonah Davis  : 1955  MRN: [id number]  Referring provider: Oliverio Manning  Dx:   Encounter Diagnosis     ICD-10-CM    1  Right knee pain, unspecified chronicity  M25 561       2  Status post right knee replacement  Z96 [id number]           Start Time: 0930  Stop Time: 1030  Total time in clinic (min): 60 minutes    Assessment  Assessment details: Patient is a pleasant 75 yo female presenting status post R TKA  Patient presents with limited active knee flexion ROM, however, showed improvement from IE performed by referring clinic in [place]    Patient also presents with knee valgus during squat motion, would cause pain on the anteromedial portion of the knee, was able to correct and decrease pain with visual and verbal cueing to keep knees pointing out  However, patient presented with decreased quadricep strength with correction being made  Patient also presented with decreased strength with hip flexion, could also be contributing to abnormal technique with squat  Performed exercises after examination, specifically bike, step-downs, bridges, and SLR  Patient responded well to exercises  Presented with increased fatigue with step-down exercise  Patient shows good motivation which will improve prognosis  Patient would benefit from skilled PT in order to decrease pain levels and achieve patients goals  Impairments: abnormal or restricted ROM, activity intolerance and impaired physical strength  Understanding of Dx/Px/POC: good   Prognosis: good    Goals  ST  Patient will increase knee flexion strength by 1/2 a strength grade, which will result in an improved tolerance to descending stairs and decreased pain in 3 weeks  2  Patient will be able to walk for 10 minutes at 2 5 mph without rest within 3 weeks, showing improved endurance  3  Patient will increase knee flexion AROM to 130* in 3 weeks in order to improve tolerance to descending stairs  LT  Patient will return to walking 1-2 miles without knee pain/fatigue at the conclusion of therapy  2  Patient will be able to descend 11-14 stairs without pain or compensation in reciprocal pattern at the conclusion of therapy  3  Patient will demonstrate strength of 4+/5 in all planes of knee, resulting in normal squat mechanics without cuing or compensation by the conclusion of therapy       Plan  Patient would benefit from: skilled physical therapy  Planned modality interventions: cryotherapy  Planned therapy interventions: balance, functional ROM exercises, home exercise program, patient education, self care, strengthening, stretching, therapeutic activities, therapeutic exercise, transfer training, manual therapy, gait training, coordination and body mechanics training  Other planned therapy interventions: endurance training  Frequency: 2x week  Duration in visits: 8  Duration in weeks: 4  Treatment plan discussed with: patient        Subjective Evaluation    History of Present Illness  Date of surgery: 3/21/2023  Mechanism of injury: surgery  Mechanism of injury: Patient is a 75 yo female coming into therapy status post R TKA on 3/21/2023  Pt stated that she had the procedure and originally began therapy 3 days post [place] in [place] and had continued with PT there until a few weeks ago before traveling to [place] indefinitely  Patient states that she currently lives with her brother on the second floor  States that going down stairs is one her difficulties  States that it feels weak and the inferior area of the knee feels tight  States she begins descending with step by step descent but improves to alternating step as she approaches the end  She does report that she has no issues with climbing stairs  Patient also reports that she attempts to walk with her friends, but states her leg fatigues quickly and she needs to take multiple breaks to rest   Also states that on her walks she noticed her endurance has decreased because she has not walked as far as she used to  Pain:  Best: 3  Worst:0  Current: 0    AGGS: When patient walks for a prolonged period of time, standing or sitting for a prolonged period, and transferring from sitting to standing after she had been sitting for a prolonged period  Relieved:  Patient is able to relieve her symptoms with resting the area and applying ice multiple times a day  GOALS: Patients goals include being able to go down steps without pain, and be able to walk with her friends without fatiguing quickly and inquiring pain      Quality of life: good    Pain  Current pain ratin  At best pain ratin  At worst pain rating: 3  Quality: discomfort  Relieving factors: ice and rest  Aggravating "factors: walking, standing and sitting  Progression: improved    Social Support  Steps to enter house: yes  5  Stairs in house: yes   11  Lives in: multiple-level home    Patient Goals  Patient goals for therapy: increased strength, decreased pain and return to sport/leisure activities          Objective     Active Range of Motion     Right Knee   Flexion: 120 degrees   Extension: 3 degrees     Passive Range of Motion     Right Knee   Flexion: 125 degrees     Strength/Myotome Testing     Left Hip   Planes of Motion   Flexion: 4    Right Hip   Planes of Motion   Flexion: 4-  Adduction: 5  External rotation: 4+  Internal rotation: 4+    Left Knee   Flexion: 4-  Extension: 5    Right Knee   Flexion: 4-  Extension: 4    Tests     Additional Tests Details  06/06/23   Squat x3: patient presented knee valgus collapse  SLS: no impairment  Heel Raise x3: no impairment      Flowsheet Rows    Flowsheet Row Most Recent Value   PT/OT G-Codes    Current Score 65   Projected Score 80              Precautions: R TKA    Daily Treatment Diary    Date 6/6            FOTO IE            Re-Eval IE            Auth visit # 1               Manuals                                                        Neuro Re-Ed                                                                                                Ther Ex    Bridges GTB 5\" 2x10            SLR 2# 2x10            Wall ball squats Red GTB around knees 2x10 5\" descent            Step downs 0R heel tap 2x10             SA Leg press nv b/l up- rle only down 2x10           SLS nv            LAQ nv            SA leg curls nv            Ther Activity    Bike 10 min                         Gait Training                              Modalities                                       Patient supervised through session by RODY Martines, with direct supervision by Ramon Smith DPT                     " no

## 2023-06-06 NOTE — PROGRESS NOTES
PT Evaluation     Today's date: 2023  Patient name: Alley Galan  : 1955  MRN: [id number]  Referring provider: Catarino Chen  Dx:   Encounter Diagnosis     ICD-10-CM    1  Right knee pain, unspecified chronicity  M25 561       2  Status post right knee replacement  Z96 651           Start Time: 930  Stop Time: 1030  Total time in clinic (min): 60 minutes    Assessment  Assessment details: Patient is a pleasant 75 yo female presenting status post R TKA  Patient presents with limited active knee flexion ROM, however, showed improvement from IE performed by referring clinic in [place]  Patient also presents with knee valgus during squat motion, would cause pain on the anteromedial portion of the knee, was able to correct and decrease pain with visual and verbal cueing to keep knees pointing out  However, patient presented with decreased quadricep strength with correction being made  Patient also presented with decreased strength with hip flexion, could also be contributing to abnormal technique with squat  Performed exercises after examination, specifically bike, step-downs, bridges, and SLR  Patient responded well to exercises  Presented with increased fatigue with step-down exercise  Patient shows good motivation which will improve prognosis  Patient would benefit from skilled PT in order to decrease pain levels and achieve patients goals  Impairments: abnormal or restricted ROM, activity intolerance and impaired physical strength  Understanding of Dx/Px/POC: good   Prognosis: good    Goals  ST  Patient will increase knee flexion strength by 1/2 a strength grade, which will result in an improved tolerance to descending stairs and decreased pain in 3 weeks  2  Patient will be able to walk for 10 minutes at 2 5 mph without rest within 3 weeks, showing improved endurance    3  Patient will increase knee flexion AROM to 130* in 3 weeks in order to improve tolerance to descending stairs  LT  Patient will return to walking 1-2 miles without knee pain/fatigue at the conclusion of therapy  2  Patient will be able to descend 11-14 stairs without pain or compensation in reciprocal pattern at the conclusion of therapy  3  Patient will demonstrate strength of 4+/5 in all planes of knee, resulting in normal squat mechanics without cuing or compensation by the conclusion of therapy  Plan  Patient would benefit from: skilled physical therapy  Planned modality interventions: cryotherapy  Planned therapy interventions: balance, functional ROM exercises, home exercise program, patient education, self care, strengthening, stretching, therapeutic activities, therapeutic exercise, transfer training, manual therapy, gait training, coordination and body mechanics training  Other planned therapy interventions: endurance training  Frequency: 2x week  Duration in visits: 8  Duration in weeks: 4  Treatment plan discussed with: patient        Subjective Evaluation    History of Present Illness  Date of surgery: 3/21/2023  Mechanism of injury: surgery  Mechanism of injury: Patient is a 73 yo female coming into therapy status post R TKA on 3/21/2023  Pt stated that she had the procedure and originally began therapy 3 days post op in [place] and had continued with PT there until a few weeks ago before traveling to [place] indefinitely  Patient states that she currently lives with her brother on the second floor  States that going down stairs is one her difficulties  States that it feels weak and the inferior area of the knee feels tight  States she begins descending with step by step descent but improves to alternating step as she approaches the end  She does report that she has no issues with climbing stairs    Patient also reports that she attempts to walk with her friends, but states her leg fatigues quickly and she needs to take multiple breaks to rest   Also states that on her walks she noticed her endurance has decreased because she has not walked as far as she used to  Pain:  Best: 3  Worst:0  Current: 0    AGGS: When patient walks for a prolonged period of time, standing or sitting for a prolonged period, and transferring from sitting to standing after she had been sitting for a prolonged period  Relieved:  Patient is able to relieve her symptoms with resting the area and applying ice multiple times a day  GOALS: Patients goals include being able to go down steps without pain, and be able to walk with her friends without fatiguing quickly and inquiring pain      Quality of life: good    Pain  Current pain ratin  At best pain ratin  At worst pain rating: 3  Quality: discomfort  Relieving factors: ice and rest  Aggravating factors: walking, standing and sitting  Progression: improved    Social Support  Steps to enter house: yes  5  Stairs in house: yes   11  Lives in: multiple-level home    Patient Goals  Patient goals for therapy: increased strength, decreased pain and return to sport/leisure activities          Objective     Active Range of Motion     Right Knee   Flexion: 120 degrees   Extension: 3 degrees     Passive Range of Motion     Right Knee   Flexion: 125 degrees     Strength/Myotome Testing     Left Hip   Planes of Motion   Flexion: 4    Right Hip   Planes of Motion   Flexion: 4-  Adduction: 5  External rotation: 4+  Internal rotation: 4+    Left Knee   Flexion: 4-  Extension: 5    Right Knee   Flexion: 4-  Extension: 4    Tests     Additional Tests Details  23   Squat x3: patient presented knee valgus collapse  SLS: no impairment  Heel Raise x3: no impairment      Flowsheet Rows    Flowsheet Row Most Recent Value   PT/OT G-Codes    Current Score 65   Projected Score 80               Precautions: R TKA    Daily Treatment Diary    Date             FOTO IE            Re-Eval IE            Auth visit # 1               Manuals "                            Neuro Re-Ed                                                                                                Ther Ex    Bridges GTB 5\" 2x10            SLR 2# 2x10            Wall ball squats Red GTB around knees 2x10 5\" descent            Step downs 0R heel tap 2x10             SA Leg press nv b/l up- rle only down 2x10           SLS nv            LAQ nv            SA leg curls nv            Ther Activity    Bike 10 min                         Gait Training                              Modalities                                        Patient supervised through session by Samy Juarez, SPT, with direct supervision by TIP MichelT     "

## 2023-06-12 ENCOUNTER — OFFICE VISIT (OUTPATIENT)
Dept: PHYSICAL THERAPY | Facility: REHABILITATION | Age: 68
End: 2023-06-12
Payer: COMMERCIAL

## 2023-06-12 DIAGNOSIS — M25.561 RIGHT KNEE PAIN, UNSPECIFIED CHRONICITY: ICD-10-CM

## 2023-06-12 DIAGNOSIS — Z96.651 STATUS POST RIGHT KNEE REPLACEMENT: Primary | ICD-10-CM

## 2023-06-12 PROCEDURE — 97110 THERAPEUTIC EXERCISES: CPT | Performed by: PHYSICAL THERAPIST

## 2023-06-12 PROCEDURE — 97530 THERAPEUTIC ACTIVITIES: CPT | Performed by: PHYSICAL THERAPIST

## 2023-06-12 NOTE — PROGRESS NOTES
Daily Note     Today's date: 2023  Patient name: Peg Nash  : 1955  MRN: [id number]  Referring provider: Hamida Almeida*  Dx:   Encounter Diagnosis     ICD-10-CM    1  Status post right knee replacement  Z96 651       2  Right knee pain, unspecified chronicity  M25 561                      Subjective: Pt comes to therapy denying any complaints of pain today  Reports that after last session she felt sore and iced multiple times at night the same day of the session  States that the soreness had subsided by the next morning  Patient reports that her knee does feel tight today, however she believes that it is due to doing a lot of walking and prolonged standing over the weekend  Objective: See treatment diary below      Assessment: Tolerated treatment well  Patient showed significant improvements since last session  Patient did not require a band for cueing in order keep her knees from having a valgus action when performing a wall ball squat  Patient responded well to the introduction of new exercises  Showed signs of fatigue post newly introduced exercises  Showed patient two stretches to perform at home when she has the tightness feeling described at the beginning of the session  Educated patient on potential increase in soreness due to added exercises and progression  Applied icepak at the end of session due to patient performing prolonged ambulation post treatment session  Patient demonstrated fatigue post treatment, exhibited good technique with therapeutic exercises and would benefit from continued PT      Plan: Continue per plan of care        Precautions: R TKA    Daily Treatment Diary    Date            FOTO IE            Re-Eval IE            Auth visit # 1               Manuals                                                        Neuro Re-Ed                                                                                                Ther Ex    Suzie SHANNON "5\" 2x10 GTB 5\" 2x10 +add 2x10           SLR 2# 2x10 2# 5\" 2x10           Wall ball squats Red GTB around knees 2x10 5\" descent [employer] 2x10 5\" descent           Step downs 0R heel tap 2x10  0R heel tap 2x10            SA Leg press nv b/l up- rle only down x15 2x10 nv          SLS nv  30\" x3           Prone quad stretch w/ strap  30\" x3           Supine hamstring stretch w/ strap  30\"x3           SA leg ext/ curls nv 2x10 ea           Ther Activity    Bike 10 min 10'                        Gait Training                              Modalities    CP  8'                                       Patient supervised through session by Jose Manuel Palafox, SPT, with direct supervision by TIP WalkerT     "

## 2023-06-14 ENCOUNTER — OFFICE VISIT (OUTPATIENT)
Dept: PHYSICAL THERAPY | Facility: REHABILITATION | Age: 68
End: 2023-06-14
Payer: COMMERCIAL

## 2023-06-14 DIAGNOSIS — Z96.651 STATUS POST RIGHT KNEE REPLACEMENT: Primary | ICD-10-CM

## 2023-06-14 DIAGNOSIS — M25.561 RIGHT KNEE PAIN, UNSPECIFIED CHRONICITY: ICD-10-CM

## 2023-06-14 PROCEDURE — 97110 THERAPEUTIC EXERCISES: CPT

## 2023-06-14 PROCEDURE — 97112 NEUROMUSCULAR REEDUCATION: CPT

## 2023-06-14 NOTE — PROGRESS NOTES
"Daily Note     Today's date: 2023  Patient name: Peg Nash  : 1955  MRN: [id number]  Referring provider: Hamida Almeida*  Dx:   Encounter Diagnosis     ICD-10-CM    1  Status post right knee replacement  Z96 651       2  Right knee pain, unspecified chronicity  M25 561                      Subjective: pt reports c/o achiness in knee prior to beginning prior today  She noted she usually takes ibuprofen in the a m , but didn't take any this morning  Objective: See treatment diary below      Assessment: Tolerated treatment well and without complaints  Appropriate level of challenge exhibited with exercises  Patient demonstrated fatigue post treatment, exhibited good technique with therapeutic exercises and would benefit from continued PT      Plan: Continue per plan of care  Progress treatment as tolerated         Precautions: R TKA    Daily Treatment Diary    Date           FOTO IE            Re-Eval IE            Auth visit # 1               Manuals                                                        Neuro Re-Ed                                                                                                Ther Ex    Bridges GTB 5\" 2x10 GTB 5\" 2x10 +add 2x10 GTB 5\" 2x10 +add 2x10          SLR 2# 2x10 2# 5\" 2x10 2# 5\" 2x10          Wall ball squats Red GTB around knees 2x10 5\" descent [employer] 2x10 5\" descent [employer] 2x10 5\" descent          Step downs 0R heel tap 2x10  0R heel tap 2x10  0R heel tap 2x10          SA Leg press nv b/l up- rle only down x15 nv          SLS nv  30\" x3 30\"x3          Prone quad stretch w/ strap  30\" x3 30\"x3          Supine hamstring stretch w/ strap  30\"x3 30\"x3          SA leg ext/ curls nv 2x10 ea 22#/33# 2x10 ea          Ther Activity    Bike 10 min 10' 10'                       Gait Training                              Modalities    CP  8'                                 Access Code: [email or web address]  URL: " [email or web address]  Date: 06/14/2023  Prepared by:  Evan Cleary    Exercises  - Supine Hamstring Stretch with Strap  - 1 x daily - 7 x weekly - 1 sets - 3 reps - 30 hold  - Prone Quadriceps Stretch with Strap  - 1 x daily - 7 x weekly - 1 sets - 3 reps - 30 hold  - Supine Active Straight Leg Raise  - 1 x daily - 7 x weekly - 2 sets - 10 reps - 5 hold  - Bridge with Hip Abduction and Resistance  - 1 x daily - 7 x weekly - 2 sets - 10 reps - 5 hold  - Foam Roller Bridge with Hip Adduction Ball Squeeze  - 1 x daily - 7 x weekly - 2 sets - 10 reps

## 2023-06-19 ENCOUNTER — OFFICE VISIT (OUTPATIENT)
Dept: PHYSICAL THERAPY | Facility: REHABILITATION | Age: 68
End: 2023-06-19
Payer: COMMERCIAL

## 2023-06-19 DIAGNOSIS — M25.561 RIGHT KNEE PAIN, UNSPECIFIED CHRONICITY: ICD-10-CM

## 2023-06-19 DIAGNOSIS — Z96.651 STATUS POST RIGHT KNEE REPLACEMENT: Primary | ICD-10-CM

## 2023-06-19 PROCEDURE — 97530 THERAPEUTIC ACTIVITIES: CPT

## 2023-06-19 PROCEDURE — 97110 THERAPEUTIC EXERCISES: CPT

## 2023-06-19 PROCEDURE — 97112 NEUROMUSCULAR REEDUCATION: CPT

## 2023-06-19 NOTE — PROGRESS NOTES
"Daily Note     Today's date: 2023  Patient name: Darrius Martinez  : 1955  MRN: [id number]  Referring provider: Isacc Jordan*  Dx:   Encounter Diagnosis     ICD-10-CM    1  Status post right knee replacement  Z96 651       2  Right knee pain, unspecified chronicity  M25 561                      Subjective: pt reports walking extensively over the weekend with some soreness in posterior aspect of R knee, but nothing significant  She noted icing and taking Motrin at the end of each day  Objective: See treatment diary below      Assessment: Tolerated treatment well  Patient demonstrated fatigue post treatment, exhibited good technique with therapeutic exercises and would benefit from continued PT      Plan: Continue per plan of care  Progress treatment as tolerated         Precautions: R TKA    Daily Treatment Diary    Date          FOTO IE            Re-Eval IE            Auth visit # 1               Manuals                                                        Neuro Re-Ed                                                                                                Ther Ex    Bridges GTB 5\" 2x10 GTB 5\" 2x10 +add 2x10 GTB 5\" 2x10 +add 2x10 BTB 5\" 2x10  +add  2x10         SLR 2# 2x10 2# 5\" 2x10 2# 5\" 2x10 2# 5\" 2x10         Wall ball squats Red GTB around knees 2x10 5\" descent [employer] 2x10 5\" descent [employer] 2x10 5\" descent [employer] 2x10 5\" descent         Step downs 0R heel tap 2x10  0R heel tap 2x10  0R heel tap 2x10 0R heel tap 2x10         SA Leg press nv b/l up- rle only down x15 nv 22# 2x10         SLS nv  30\" x3 30\"x3 30\"x3         Prone quad stretch w/ strap  30\" x3 30\"x3 30\"x3         Supine hamstring stretch w/ strap  30\"x3 30\"x3 30\"x3         SA leg ext/ curls nv 2x10 ea 22#/33# 2x10 ea 22#/33# 2x10 ea         Ther Activity    Bike 10 min 10' 10' 10'                      Gait Training                              Modalities    CP  8'                   " Access Code: [email or web address]  URL: [email or web address]  Date: 06/14/2023  Prepared by:  Lizbeth Silva    Exercises  - Supine Hamstring Stretch with Strap  - 1 x daily - 7 x weekly - 1 sets - 3 reps - 30 hold  - Prone Quadriceps Stretch with Strap  - 1 x daily - 7 x weekly - 1 sets - 3 reps - 30 hold  - Supine Active Straight Leg Raise  - 1 x daily - 7 x weekly - 2 sets - 10 reps - 5 hold  - Bridge with Hip Abduction and Resistance  - 1 x daily - 7 x weekly - 2 sets - 10 reps - 5 hold  - Foam Roller Bridge with Hip Adduction Ball Squeeze  - 1 x daily - 7 x weekly - 2 sets - 10 reps

## 2023-06-21 ENCOUNTER — OFFICE VISIT (OUTPATIENT)
Dept: PHYSICAL THERAPY | Facility: REHABILITATION | Age: 68
End: 2023-06-21
Payer: COMMERCIAL

## 2023-06-21 DIAGNOSIS — M25.561 RIGHT KNEE PAIN, UNSPECIFIED CHRONICITY: ICD-10-CM

## 2023-06-21 DIAGNOSIS — Z96.651 STATUS POST RIGHT KNEE REPLACEMENT: Primary | ICD-10-CM

## 2023-06-21 PROCEDURE — 97110 THERAPEUTIC EXERCISES: CPT | Performed by: PHYSICAL THERAPIST

## 2023-06-21 PROCEDURE — 97112 NEUROMUSCULAR REEDUCATION: CPT | Performed by: PHYSICAL THERAPIST

## 2023-06-21 NOTE — PROGRESS NOTES
"Daily Note     Today's date: 2023  Patient name: Alondra Woodall  : 1955  MRN: [id number]  Referring provider: Gabriele Macedo*  Dx:   Encounter Diagnosis     ICD-10-CM    1  Status post right knee replacement  Z96 651       2  Right knee pain, unspecified chronicity  M25 561                      Subjective: Pt reports she has minimal discomfort during the day, however, experiences a sharp pain during the night for approx 5 minutes in area of distal quad/quad tendon  Objective: See treatment diary below      Assessment: Tolerated treatment well  Demonstrates good flexibility and mobility  Continues to require focus to ensure proper form during standing strengthening exercises  Patient demonstrated fatigue post treatment, exhibited good technique with therapeutic exercises and would benefit from continued PT      Plan: Progress treatment as tolerated         Precautions: R TKA    Daily Treatment Diary    Date         FOTO IE            Re-Eval IE            Auth visit # 1               Manuals                                                        Neuro Re-Ed                                                                                                Ther Ex    Bridges GTB 5\" 2x10 GTB 5\" 2x10 +add 2x10 GTB 5\" 2x10 +add 2x10 BTB 5\" 2x10  +add  2x10 BTB 5\" 2x10  +add  2x10        SLR 2# 2x10 2# 5\" 2x10 2# 5\" 2x10 2# 5\" 2x10 3# 5\" 2x10        Wall ball squats Red GTB around knees 2x10 5\" descent [employer] 2x10 5\" descent [employer] 2x10 5\" descent [employer] 2x10 5\" descent [employer] 2x10 5\" descent        Step downs 0R heel tap 2x10  0R heel tap 2x10  0R heel tap 2x10 0R heel tap 2x10 0R heel tap 2x10        SA Leg press nv b/l up- rle only down x15 nv 22# 2x10 44# 2x10        SLS nv  30\" x3 30\"x3 30\"x3 30\"x2  30\"x1 blue        Prone quad stretch w/ strap  30\" x3 30\"x3 30\"x3 30\"x3        Supine hamstring stretch w/ strap  30\"x3 30\"x3 30\"x3 30\"x3        SA leg " ext/ curls nv 2x10 ea 22#/33# 2x10 ea 22#/33# 2x10 ea 22#/33# 2x10 ea        Ther Activity    Bike 10 min 10' 10' 10' 10'                     Gait Training                              Modalities    CP  8'   10' post                              Access Code: [email or web address]  URL: [email or web address]  Date: 06/14/2023  Prepared by:  Mayda Farmer    Exercises  - Supine Hamstring Stretch with Strap  - 1 x daily - 7 x weekly - 1 sets - 3 reps - 30 hold  - Prone Quadriceps Stretch with Strap  - 1 x daily - 7 x weekly - 1 sets - 3 reps - 30 hold  - Supine Active Straight Leg Raise  - 1 x daily - 7 x weekly - 2 sets - 10 reps - 5 hold  - Bridge with Hip Abduction and Resistance  - 1 x daily - 7 x weekly - 2 sets - 10 reps - 5 hold  - Foam Roller Bridge with Hip Adduction Ball Squeeze  - 1 x daily - 7 x weekly - 2 sets - 10 reps

## 2023-06-26 ENCOUNTER — OFFICE VISIT (OUTPATIENT)
Dept: PHYSICAL THERAPY | Facility: REHABILITATION | Age: 68
End: 2023-06-26
Payer: COMMERCIAL

## 2023-06-26 DIAGNOSIS — Z96.651 STATUS POST RIGHT KNEE REPLACEMENT: Primary | ICD-10-CM

## 2023-06-26 DIAGNOSIS — M25.561 RIGHT KNEE PAIN, UNSPECIFIED CHRONICITY: ICD-10-CM

## 2023-06-26 PROCEDURE — 97112 NEUROMUSCULAR REEDUCATION: CPT | Performed by: PHYSICAL THERAPIST

## 2023-06-26 PROCEDURE — 97110 THERAPEUTIC EXERCISES: CPT | Performed by: PHYSICAL THERAPIST

## 2023-06-26 NOTE — PROGRESS NOTES
"Daily Note     Today's date: 2023  Patient name: Dane Johnson  : 1955  MRN: [id number]  Referring provider: Pablo Wallace*  Dx:   Encounter Diagnosis     ICD-10-CM    1  Status post right knee replacement  Z96 651       2  Right knee pain, unspecified chronicity  M25 561           Start Time: 945  Stop Time: 1035  Total time in clinic (min): 50 minutes    Subjective: Pt reports to therapy with a c/c of a new onset of discomfort of the posteromedial region of the right knee  Reports she noticed the discomfort when she was walking over the weekend  Objective: See treatment diary below      Assessment: Tolerated treatment well  Required tactile and verbal cues during step downs  Increased riser caused patient to compensate with valgus collapse of right knee  Cued to keep knee pointing lateral over 5th digit  Tolerated new exercises well  Educated patient on medial pain  Reported reproduction of medial knee symptoms with hamstring stretches on table  Discussed stretching prior to walking and potentially altering gait mechanics if stretching doesn't create relief  Patient demonstrated fatigue post treatment, exhibited good technique with therapeutic exercises and would benefit from continued PT      Plan: Continue per plan of care        Precautions: R TKA    Daily Treatment Diary    Date        FOTO IE            Re-Eval IE            Auth visit # 1               Manuals                                                        Neuro Re-Ed                                                                                                Ther Ex    Bridges GTB 5\" 2x10 GTB 5\" 2x10 +add 2x10 GTB 5\" 2x10 +add 2x10 BTB 5\" 2x10  +add  2x10 BTB 5\" 2x10  +add  2x10 Purple 5\" 2x10 +add  2x10       SLR 2# 2x10 2# 5\" 2x10 2# 5\" 2x10 2# 5\" 2x10 3# 5\" 2x10 3# 5\" 2x10       Wall ball squats Red GTB around knees 2x10 5\" descent [employer] 2x10 5\" descent [employer] 2x10 5\" " "descent [employer] 2x10 5\" descent [employer] 2x10 5\" descent [employer] 2x10 5\" descent       Step downs 0R heel tap 2x10  0R heel tap 2x10  0R heel tap 2x10 0R heel tap 2x10 0R heel tap 2x10 1R heel tap 2x10       SA Leg press nv b/l up- rle only down x15 nv 22# 2x10 44# 2x10 66# 2x10       Slider lunges      x8       SLS nv  30\" x3 30\"x3 30\"x3 30\"x2  30\"x1 blue Blue pad 30\" x2       Prone quad stretch w/ strap  30\" x3 30\"x3 30\"x3 30\"x3        Supine hamstring stretch w/ strap  30\"x3 30\"x3 30\"x3 30\"x3 30\"x3       SA leg ext/ curls nv 2x10 ea 22#/33# 2x10 ea 22#/33# 2x10 ea 22#/33# 2x10 ea 33# 2x10 ea       Ther Activity    Bike 10 min 10' 10' 10' 10'        TM      10'       Gait Training                              Modalities    CP  8'   10' post                              Access Code: [email or web address]  URL: [email or web address]  Date: 06/14/2023  Prepared by:  Genia Golden    Exercises  - Supine Hamstring Stretch with Strap  - 1 x daily - 7 x weekly - 1 sets - 3 reps - 30 hold  - Prone Quadriceps Stretch with Strap  - 1 x daily - 7 x weekly - 1 sets - 3 reps - 30 hold  - Supine Active Straight Leg Raise  - 1 x daily - 7 x weekly - 2 sets - 10 reps - 5 hold  - Bridge with Hip Abduction and Resistance  - 1 x daily - 7 x weekly - 2 sets - 10 reps - 5 hold  - Foam Roller Bridge with Hip Adduction Ball Squeeze  - 1 x daily - 7 x weekly - 2 sets - 10 reps              Patient supervised through session by Brayan Laureano, SPT, with direct supervision by TIP KeeneT     "

## 2023-06-28 ENCOUNTER — OFFICE VISIT (OUTPATIENT)
Dept: PHYSICAL THERAPY | Facility: REHABILITATION | Age: 68
End: 2023-06-28
Payer: COMMERCIAL

## 2023-06-28 DIAGNOSIS — Z96.651 STATUS POST RIGHT KNEE REPLACEMENT: Primary | ICD-10-CM

## 2023-06-28 DIAGNOSIS — M25.561 RIGHT KNEE PAIN, UNSPECIFIED CHRONICITY: ICD-10-CM

## 2023-06-28 PROCEDURE — 97110 THERAPEUTIC EXERCISES: CPT

## 2023-06-28 PROCEDURE — 97530 THERAPEUTIC ACTIVITIES: CPT

## 2023-06-28 PROCEDURE — 97112 NEUROMUSCULAR REEDUCATION: CPT

## 2023-06-28 NOTE — PROGRESS NOTES
"Daily Note     Today's date: 2023  Patient name: Buster Hollingsworth  : 1955  MRN: [id number]  Referring provider: Jyoti Fierro*  Dx:   Encounter Diagnosis     ICD-10-CM    1  Status post right knee replacement  Z96 651       2  Right knee pain, unspecified chronicity  M25 561                      Subjective: pt reports with c/o posteromedial knee pain prior to beginning today  She noted pain worsened by the afternoon after last session  Objective: See treatment diary below      Assessment: Tolerated treatment well  Frequent cues to stabilize correctly with lateral step downs; added hip hiking to promote QL activation due to ipsilateral lowering of hip  Mild discomfort noted in lateral hamstring region with SA ham curls, but nothing significant  Good carry over and form with remainder of exercises  Patient demonstrated fatigue post treatment, exhibited good technique with therapeutic exercises and would benefit from continued PT      Plan: Continue per plan of care  Progress treatment as tolerated         Precautions: R TKA    Daily Treatment Diary    Date       FOTO IE            Re-Eval IE            Auth visit # 1               Manuals                                                        Neuro Re-Ed                                                                                                Ther Ex    Bridges GTB 5\" 2x10 GTB 5\" 2x10 +add 2x10 GTB 5\" 2x10 +add 2x10 BTB 5\" 2x10  +add  2x10 BTB 5\" 2x10  +add  2x10 Purple 5\" 2x10 +add  2x10 Purple 5\" 2x10 +add  2x10      SLR 2# 2x10 2# 5\" 2x10 2# 5\" 2x10 2# 5\" 2x10 3# 5\" 2x10 3# 5\" 2x10 3# 5\" 2x10      Wall ball squats Red GTB around knees 2x10 5\" descent [employer] 2x10 5\" descent [employer] 2x10 5\" descent [employer] 2x10 5\" descent [employer] 2x10 5\" descent [employer] 2x10 5\" descent Red Ball 2x10 5\" descent      Hip hikes       0R x10 b/l      Step downs 0R heel tap 2x10  0R heel tap 2x10  0R heel " "tap 2x10 0R heel tap 2x10 0R heel tap 2x10 1R heel tap 2x10 0R 2x10      SA Leg press nv b/l up- rle only down x15 nv 22# 2x10 44# 2x10 66# 2x10 66# 2x10      Slider lunges      x8 3 way x5 ea      SLS nv  30\" x3 30\"x3 30\"x3 30\"x2  30\"x1 blue Blue pad 30\" x2 Blue pad 30\" x2      Prone quad stretch w/ strap  30\" x3 30\"x3 30\"x3 30\"x3        Supine hamstring stretch w/ strap  30\"x3 30\"x3 30\"x3 30\"x3 30\"x3 30\"x3      SA leg ext/ curls nv 2x10 ea 22#/33# 2x10 ea 22#/33# 2x10 ea 22#/33# 2x10 ea 33# 2x10 ea 33# 2x10 ea      Ther Activity    Bike 10 min 10' 10' 10' 10'  10'      TM      10'       Gait Training                              Modalities    CP  8'   10' post  10' post                            Access Code: [email or web address]  URL: [email or web address]  Date: 06/14/2023  Prepared by:  Fatuma Lin    Exercises  - Supine Hamstring Stretch with Strap  - 1 x daily - 7 x weekly - 1 sets - 3 reps - 30 hold  - Prone Quadriceps Stretch with Strap  - 1 x daily - 7 x weekly - 1 sets - 3 reps - 30 hold  - Supine Active Straight Leg Raise  - 1 x daily - 7 x weekly - 2 sets - 10 reps - 5 hold  - Bridge with Hip Abduction and Resistance  - 1 x daily - 7 x weekly - 2 sets - 10 reps - 5 hold  - Foam Roller Bridge with Hip Adduction Ball Squeeze  - 1 x daily - 7 x weekly - 2 sets - 10 reps               "

## 2023-07-03 ENCOUNTER — OFFICE VISIT (OUTPATIENT)
Dept: PHYSICAL THERAPY | Facility: REHABILITATION | Age: 68
End: 2023-07-03
Payer: COMMERCIAL

## 2023-07-03 DIAGNOSIS — Z96.651 STATUS POST RIGHT KNEE REPLACEMENT: Primary | ICD-10-CM

## 2023-07-03 DIAGNOSIS — M25.561 RIGHT KNEE PAIN, UNSPECIFIED CHRONICITY: ICD-10-CM

## 2023-07-03 PROCEDURE — 97110 THERAPEUTIC EXERCISES: CPT | Performed by: PHYSICAL THERAPIST

## 2023-07-03 PROCEDURE — 97530 THERAPEUTIC ACTIVITIES: CPT | Performed by: PHYSICAL THERAPIST

## 2023-07-03 PROCEDURE — 97112 NEUROMUSCULAR REEDUCATION: CPT | Performed by: PHYSICAL THERAPIST

## 2023-07-03 NOTE — PROGRESS NOTES
Daily Note     Today's date: 7/3/2023  Patient name: Felisha De Jesus  : 1955  MRN: [id number]  Referring provider: Keith Borja*  Dx:   Encounter Diagnosis     ICD-10-CM    1. Status post right knee replacement  Z96.651       2. Right knee pain, unspecified chronicity  M25.561                      Subjective: Pt comes to therapy denying pain or changes in status. States that she still is having difficulty descending stairs. Reports that she has seen improvements but would say it is still her biggest deficit. Patient also reports she has increased her walking tolerance, reports an increase in about 4,000 steps. Objective: See treatment diary below      Assessment: Tolerated treatment well. Required cues via theraband with step down and leg press in order to cue from having valgus collapse of right knee. Patient reported mild difficulty with correction being made. Patient demonstrated fatigue post treatment, exhibited good technique with therapeutic exercises and would benefit from continued PT      Plan: Continue per plan of care. Progress treatment as tolerated.        Precautions: R TKA    Daily Treatment Diary    Date 6/6 6/12 6/14 6/19 6/21 6/26 6/28 7/3     FOTO IE            Re-Eval IE            Auth visit # 1               Manuals                                                        Neuro Re-Ed                                                                                                Ther Ex    Bridges GTB 5" 2x10 GTB 5" 2x10 +add 2x10 GTB 5" 2x10 +add 2x10 BTB 5" 2x10  +add  2x10 BTB 5" 2x10  +add  2x10 Purple 5" 2x10 +add  2x10 Purple 5" 2x10 +add  2x10 Purple 5" 2x10 +add  2x10     Clamshells        Purple 5" 2x10     SLR 2# 2x10 2# 5" 2x10 2# 5" 2x10 2# 5" 2x10 3# 5" 2x10 3# 5" 2x10 3# 5" 2x10      Wall ball squats Red GTB around knees 2x10 5" descent [employer] 2x10 5" descent [employer] 2x10 5" descent [employer] 2x10 5" descent [employer] 2x10 5" descent [employer] 2x10 5" descent Red Ball 2x10 5" descent Red Ball 3x10 5" descent     Hip hikes       0R x10 b/l      Step downs 0R heel tap 2x10  0R heel tap 2x10  0R heel tap 2x10 0R heel tap 2x10 0R heel tap 2x10 1R heel tap 2x10 0R 2x10 0R 3x10 blue bad for cueing     SA Leg press nv b/l up- rle only down x15 nv 22# 2x10 44# 2x10 66# 2x10 66# 2x10 22# 2x10 2 up 1 down     Slider lunges      x8 3 way x5 ea      SLS nv  30" x3 30"x3 30"x3 30"x2  30"x1 blue Blue pad 30" x2 Blue pad 30" x2      Prone quad stretch w/ strap  30" x3 30"x3 30"x3 30"x3        Supine hamstring stretch w/ strap  30"x3 30"x3 30"x3 30"x3 30"x3 30"x3      SA leg ext/ curls nv 2x10 ea 22#/33# 2x10 ea 22#/33# 2x10 ea 22#/33# 2x10 ea 33# 2x10 ea 33# 2x10 ea 33# 2x10 ea     Ther Activity    Bike 10 min 10' 10' 10' 10'  10' 10'     TM      10'       Gait Training                              Modalities    CP  8'   10' post  10' post                            Access Code: [email or web address]  URL: [email or web address]  Date: 06/14/2023  Prepared by: Akil Salas    Exercises  - Supine Hamstring Stretch with Strap  - 1 x daily - 7 x weekly - 1 sets - 3 reps - 30 hold  - Prone Quadriceps Stretch with Strap  - 1 x daily - 7 x weekly - 1 sets - 3 reps - 30 hold  - Supine Active Straight Leg Raise  - 1 x daily - 7 x weekly - 2 sets - 10 reps - 5 hold  - Bridge with Hip Abduction and Resistance  - 1 x daily - 7 x weekly - 2 sets - 10 reps - 5 hold  - Foam Roller Bridge with Hip Adduction Ball Squeeze  - 1 x daily - 7 x weekly - 2 sets - 10 reps                  Patient supervised through session by Vickie Alfaro SPT, with direct supervision by Charley Hansen DPT.

## 2023-07-05 ENCOUNTER — OFFICE VISIT (OUTPATIENT)
Dept: PHYSICAL THERAPY | Facility: REHABILITATION | Age: 68
End: 2023-07-05
Payer: COMMERCIAL

## 2023-07-05 DIAGNOSIS — M25.561 RIGHT KNEE PAIN, UNSPECIFIED CHRONICITY: ICD-10-CM

## 2023-07-05 DIAGNOSIS — Z96.651 STATUS POST RIGHT KNEE REPLACEMENT: Primary | ICD-10-CM

## 2023-07-05 PROCEDURE — 97112 NEUROMUSCULAR REEDUCATION: CPT | Performed by: PHYSICAL THERAPIST

## 2023-07-05 PROCEDURE — 97110 THERAPEUTIC EXERCISES: CPT | Performed by: PHYSICAL THERAPIST

## 2023-07-05 PROCEDURE — 97530 THERAPEUTIC ACTIVITIES: CPT | Performed by: PHYSICAL THERAPIST

## 2023-07-05 NOTE — PROGRESS NOTES
Daily Note     Today's date: 2023  Patient name: Vale Todd  : 1955  MRN: [id number]  Referring provider: Dana Stanley*  Dx:   Encounter Diagnosis     ICD-10-CM    1. Status post right knee replacement  Z96.651       2. Right knee pain, unspecified chronicity  M25.561                      Subjective: Patient comes to therapy reporting that she notes significant improvements with difficulties at the time of IE. Stating she has seen improvements with stairs, both ascending and descending as well as walking. Denies pain other than 2 specific incidents where she described experiencing a sharp burning pain on the medial inferior aspect of the right knee. Reported the first incident  occurred Monday night, noting it caused her to wake up. The other incident occurring yesterday afternoon when sitting outside. Reports the pain occurs for somewhere between 5-10 minutes and subsides, denies any complaints today due to the pain. Objective: See treatment diary below      Assessment: Tolerated treatment well. Demonstrated improved squat mechanics, patient required minimal cueing to prevent knee valgus. No adverse reactions to progression of treatment. Patient demonstrated fatigue post treatment, exhibited good technique with therapeutic exercises and would benefit from continued PT      Plan: Continue per plan of care. Progress treatment as tolerated.        Precautions: R TKA    Daily Treatment Diary    Date 6/6 6/12 6/14 6/19 6/21 6/26 6/28 7/3 7/5    FOTO IE            Re-Eval IE            Auth visit # 1               Manuals                                                        Neuro Re-Ed                                                                                                Ther Ex    Bridges GTB 5" 2x10 GTB 5" 2x10 +add 2x10 GTB 5" 2x10 +add 2x10 BTB 5" 2x10  +add  2x10 BTB 5" 2x10  +add  2x10 Purple 5" 2x10 +add  2x10 Purple 5" 2x10 +add  2x10 Purple 5" 2x10 +add  2x10 Purple 5" 3x10 +add  2x10    Clamshells        Purple 5" 2x10 Purple 5" 2x10    SLR 2# 2x10 2# 5" 2x10 2# 5" 2x10 2# 5" 2x10 3# 5" 2x10 3# 5" 2x10 3# 5" 2x10      Wall ball squats Red GTB around knees 2x10 5" descent [employer] 2x10 5" descent [employer] 2x10 5" descent [employer] 2x10 5" descent [employer] 2x10 5" descent [employer] 2x10 5" descent Red Ball 2x10 5" descent Red Ball 3x10 5" descent Red Ball 3x10 5" descent    Hip hikes       0R x10 b/l      Step downs 0R heel tap 2x10  0R heel tap 2x10  0R heel tap 2x10 0R heel tap 2x10 0R heel tap 2x10 1R heel tap 2x10 0R 2x10 0R 3x10 blue bad for cueing 0R 3x10 blue band for cueing    SA Leg press nv b/l up- rle only down x15 nv 22# 2x10 44# 2x10 66# 2x10 66# 2x10 22# 2x10 2 up 1 down 44# 2x10 2 up 1 down    Slider lunges      x8 3 way x5 ea      Step Ups         1R blue band used for cueing 3x10    SLS nv  30" x3 30"x3 30"x3 30"x2  30"x1 blue Blue pad 30" x2 Blue pad 30" x2  Blue pad 30" x2    Prone quad stretch w/ strap  30" x3 30"x3 30"x3 30"x3        Supine hamstring stretch w/ strap  30"x3 30"x3 30"x3 30"x3 30"x3 30"x3      SA leg ext/ curls nv 2x10 ea 22#/33# 2x10 ea 22#/33# 2x10 ea 22#/33# 2x10 ea 33# 2x10 ea 33# 2x10 ea 33# 2x10 ea     Ther Activity    Bike 10 min 10' 10' 10' 10'  10' 10'     TM      10'   10'    Gait Training                              Modalities    CP  8'   10' post  10' post                            Access Code: [email or web address]  URL: [email or web address]  Date: 06/14/2023  Prepared by:  Kathalene Kraft    Exercises  - Supine Hamstring Stretch with Strap  - 1 x daily - 7 x weekly - 1 sets - 3 reps - 30 hold  - Prone Quadriceps Stretch with Strap  - 1 x daily - 7 x weekly - 1 sets - 3 reps - 30 hold  - Supine Active Straight Leg Raise  - 1 x daily - 7 x weekly - 2 sets - 10 reps - 5 hold  - Bridge with Hip Abduction and Resistance  - 1 x daily - 7 x weekly - 2 sets - 10 reps - 5 hold  - Foam Roller Bridge with Hip Adduction J[employer] Squeeze  - 1 x daily - 7 x weekly - 2 sets - 10 reps                    Patient supervised through session by RODY Reynoso, with direct supervision by Usman Snow DPT.

## 2023-07-10 ENCOUNTER — EVALUATION (OUTPATIENT)
Dept: PHYSICAL THERAPY | Facility: REHABILITATION | Age: 68
End: 2023-07-10
Payer: COMMERCIAL

## 2023-07-10 DIAGNOSIS — Z96.651 STATUS POST RIGHT KNEE REPLACEMENT: Primary | ICD-10-CM

## 2023-07-10 DIAGNOSIS — M25.561 RIGHT KNEE PAIN, UNSPECIFIED CHRONICITY: ICD-10-CM

## 2023-07-10 PROCEDURE — 97530 THERAPEUTIC ACTIVITIES: CPT | Performed by: PHYSICAL THERAPIST

## 2023-07-10 PROCEDURE — 97164 PT RE-EVAL EST PLAN CARE: CPT | Performed by: PHYSICAL THERAPIST

## 2023-07-10 PROCEDURE — 97110 THERAPEUTIC EXERCISES: CPT | Performed by: PHYSICAL THERAPIST

## 2023-07-10 NOTE — PROGRESS NOTES
PT Re-Evaluation  and PT Discharge    Today's date: 7/10/2023  Patient name: Vale Todd  : 1955  MRN: [id number]  Referring provider: Dana Stanley*  Dx:   Encounter Diagnosis     ICD-10-CM    1. Status post right knee replacement  Z96.651       2. Right knee pain, unspecified chronicity  M25.561                      Assessment  Assessment details: Since the initial evaluation, patient presents with both increased strength as well as passive and active ROM. Patient is able to perform 5 squats without pain as well as perform exercises with proper mechanics, no cues are required. Educated patient on plan for discharge and performing HEP, along with continuing to improve strengthen via HEP. Patient states she plans to join a gym, therefore, patient given list of exercises she could perform. Skilled PT is no longer required in order to achieve patients goals. To be discharged at present time. Impairments: abnormal or restricted ROM, activity intolerance and impaired physical strength  Understanding of Dx/Px/POC: good   Prognosis: good    Goals  ST. Patient will increase knee flexion strength by 1/2 a strength grade, which will result in an improved tolerance to descending stairs and decreased pain in 3 weeks. MET  2. Patient will be able to walk for 10 minutes at 2.5 mph without rest within 3 weeks, showing improved endurance. MET  3. Patient will increase knee flexion AROM to 130* in 3 weeks in order to improve tolerance to descending stairs. MET  LT. Patient will return to walking 1-2 miles without knee pain/fatigue at the conclusion of therapy. MET  2. Patient will be able to descend 11-14 stairs without pain or compensation in reciprocal pattern at the conclusion of therapy. MET  3. Patient will demonstrate strength of 4+/5 in all planes of knee, resulting in normal squat mechanics without cuing or compensation by the conclusion of therapy.  MET    Plan  Plan details: Patient to discharge to [facility] today 07/10/23    Planned therapy interventions: home exercise program  Treatment plan discussed with: patient        Subjective Evaluation    History of Present Illness  Date of surgery: 3/21/2023  Mechanism of injury: surgery  Mechanism of injury: Patient is a 73 yo female coming into therapy status post R TKA on 3/21/2023. Pt stated that she had the procedure and originally began therapy 3 days post op in [place] and had continued with PT there until a few weeks ago before traveling to [place] indefinitely. Patient states that she currently lives with her brother on the second floor. States that going down stairs is one her difficulties. States that it feels weak and the inferior area of the knee feels tight. States she begins descending with step by step descent but improves to alternating step as she approaches the end. She does report that she has no issues with climbing stairs. Patient also reports that she attempts to walk with her friends, but states her leg fatigues quickly and she needs to take multiple breaks to rest.  Also states that on her walks she noticed her endurance has decreased because she has not walked as far as she used to. Pain:  Best: 3  Worst:0  Current: 0    AGGS: When patient walks for a prolonged period of time, standing or sitting for a prolonged period, and transferring from sitting to standing after she had been sitting for a prolonged period. Relieved:  Patient is able to relieve her symptoms with resting the area and applying ice multiple times a day. GOALS: Patients goals include being able to go down steps without pain, and be able to walk with her friends without fatiguing quickly and inquiring pain. 07/10/23  Pt comes to therapy denying any negative changes in status as well as denying any pain. Reports that within the last 2 weeks she has seen a major improvement with descending stairs.   States she has not experienced any pain with the activity and she has not been having any adverse reactions with walking for prolonged distances.     AGGS: n/a    GOALS: n/a  Quality of life: good    Pain  Current pain ratin  At best pain ratin  At worst pain ratin  Quality: discomfort  Relieving factors: ice and rest  Aggravating factors: walking, standing and sitting  Progression: improved    Social Support  Steps to enter house: yes  5  Stairs in house: yes   11  Lives in: multiple-level home    Patient Goals  Patient goals for therapy: increased strength, decreased pain and return to sport/leisure activities          Objective     Active Range of Motion     Right Knee   Flexion: 125 degrees   Extension: 0 degrees     Passive Range of Motion     Right Knee   Flexion: 130 degrees     Strength/Myotome Testing     Left Hip   Planes of Motion   Flexion: 4    Right Hip   Planes of Motion   Flexion: 4-  Adduction: 5  External rotation: 4+  Internal rotation: 4+    Left Knee   Flexion: 4-  Extension: 5    Right Knee   Flexion: 4+  Extension: 4+    Tests     Additional Tests Details  23   Squat x5: no impairment  SLS: no impairment  Heel Raise x3: no impairment      07/10/23  Squat - WFL  SLS - WFL  Gait - unremarkable               Precautions: R TKA    Daily Treatment Diary    Date 6/6 6/12 6/14 6/19 6/21 6/26 6/28 7/3 7/5 7/10   FOTO IE         perf 99   Re-Eval IE            Auth visit # 1               Manuals                                                        Neuro Re-Ed                                                                                                Ther Ex    Bridges GTB 5" 2x10 GTB 5" 2x10 +add 2x10 GTB 5" 2x10 +add 2x10 BTB 5" 2x10  +add  2x10 BTB 5" 2x10  +add  2x10 Purple 5" 2x10 +add  2x10 Purple 5" 2x10 +add  2x10 Purple 5" 2x10 +add  2x10 Purple 5" 2x10 +add  2x10 Purple 5" 2x10 +add  2x10   Clamshells        Purple 5" 2x10 Purple 5" 2x10 Purple 5" 2x10   SLR 2# 2x10 2# 5" 2x10 2# 5" 2x10 2# 5" 2x10 3# 5" 2x10 3# 5" 2x10 3# 5" 2x10      Wall ball squats Red GTB around knees 2x10 5" descent [employer] 2x10 5" descent [employer] 2x10 5" descent [employer] 2x10 5" descent [employer] 2x10 5" descent [employer] 2x10 5" descent Red Ball 2x10 5" descent Red Ball 3x10 5" descent Red [employer] 3x10 5" descent Red [employer] 3x10 5" descent   Hip hikes       0R x10 b/l      Step downs 0R heel tap 2x10  0R heel tap 2x10  0R heel tap 2x10 0R heel tap 2x10 0R heel tap 2x10 1R heel tap 2x10 0R 2x10 0R 3x10 blue bad for cueing 0R 3x10 blue band for cueing 1R 2x10   SA Leg press nv b/l up- rle only down x15 nv 22# 2x10 44# 2x10 66# 2x10 66# 2x10 22# 2x10 2 up 1 down 44# 2x10 2 up 1 down 66# 2x10   Slider lunges      x8 3 way x5 ea      Side steps           BTB 16 ft 3 laps   Step Ups         1R blue band used for cueing 3x10 1R 2x10   SLS nv  30" x3 30"x3 30"x3 30"x2  30"x1 blue Blue pad 30" x2 Blue pad 30" x2  Blue pad 30" x2    Prone quad stretch w/ strap  30" x3 30"x3 30"x3 30"x3        Supine hamstring stretch w/ strap  30"x3 30"x3 30"x3 30"x3 30"x3 30"x3      SA leg ext/ curls nv 2x10 ea 22#/33# 2x10 ea 22#/33# 2x10 ea 22#/33# 2x10 ea 33# 2x10 ea 33# 2x10 ea 33# 2x10 ea     Ther Activity    Bike 10 min 10' 10' 10' 10'  10' 10'  10'   TM      10'   10'    Gait Training                              Modalities    CP  8'   10' post  10' post                        Access Code: [email or web address]  URL: [email or web address]  Date: 07/10/2023  Prepared by: Marcin Newton  - Hamstring Curl with Weight Machine  - 2 x weekly - 3 sets - 10 reps - 5 hold  - Knee Extension with Weight Machine  - 2 x weekly - 3 sets - 10 reps - 5 hold  - Full Leg Press  - 2 x weekly - 3 sets - 10 reps - 5 hold  - Step Up  - 2 x weekly - 3 sets - 10 reps - 5 hold  - Forward Step Down  - 2 x weekly - 3 sets - 10 reps - 5 hold  - Side Stepping with Resistance at Ankles  - 2 x weekly - 3 sets - 10 reps - 5 hold  - Wall Quarter Squat with [employer] - 2 x weekly - 3 sets - 10 reps - 5 hold       Patient supervised through session by RODY Eng, with direct supervision by Song Peoples DPT.

## 2023-07-10 NOTE — LETTER
July 10, 2023    Temo Wong, [address] [address]    Patient: yLdia Madden   YOB: 1955   Date of Visit: 7/10/2023     Encounter Diagnosis     ICD-10-CM    1. Status post right knee replacement  Z96.651       2. Right knee pain, unspecified chronicity  M25.561           Dear Dr. Lance Ponce: Thank you for your recent referral of Lydia Madden. Please review the attached evaluation summary from Brisa's recent visit. Please verify that you agree with the plan of care by signing the attached order. If you have any questions or concerns, please do not hesitate to call. I sincerely appreciate the opportunity to share in the care of one of your patients and hope to have another opportunity to work with you in the near future. Sincerely,    Cindy Kumar, PT      Referring Provider:      I certify that I have read the below Plan of Care and certify the need for these services furnished under this plan of treatment while under my care. Temo Wong [address] [address]  Via Fax: [phone]          PT Re-Evaluation  and PT Discharge    Today's date: 7/10/2023  Patient name: Lydia Madden  : 1955  MRN: [id number]  Referring provider: Temo Wong  Dx:   Encounter Diagnosis     ICD-10-CM    1. Status post right knee replacement  Z96.651       2. Right knee pain, unspecified chronicity  M25.561                      Assessment  Assessment details: Since the initial evaluation, patient presents with both increased strength as well as passive and active ROM. Patient is able to perform 5 squats without pain as well as perform exercises with proper mechanics, no cues are required. Educated patient on plan for discharge and performing HEP, along with continuing to improve strengthen via HEP.   Patient states she plans to join a gym, therefore, patient given list of exercises she could perform. Skilled PT is no longer required in order to achieve patients goals. To be discharged at present time. Impairments: abnormal or restricted ROM, activity intolerance and impaired physical strength  Understanding of Dx/Px/POC: good   Prognosis: good    Goals  ST. Patient will increase knee flexion strength by 1/2 a strength grade, which will result in an improved tolerance to descending stairs and decreased pain in 3 weeks. MET  2. Patient will be able to walk for 10 minutes at 2.5 mph without rest within 3 weeks, showing improved endurance. MET  3. Patient will increase knee flexion AROM to 130* in 3 weeks in order to improve tolerance to descending stairs. MET  LT. Patient will return to walking 1-2 miles without knee pain/fatigue at the conclusion of therapy. MET  2. Patient will be able to descend 11-14 stairs without pain or compensation in reciprocal pattern at the conclusion of therapy. MET  3. Patient will demonstrate strength of 4+/5 in all planes of knee, resulting in normal squat mechanics without cuing or compensation by the conclusion of therapy. MET    Plan  Plan details: Patient to discharge [facility] 07/10/23    Planned therapy interventions: home exercise program  Treatment plan discussed with: patient        Subjective Evaluation    History of Present Illness  Date of surgery: 3/21/2023  Mechanism of injury: surgery  Mechanism of injury: Patient is a 75 yo female coming into therapy status post R TKA on 3/21/2023. Pt stated that she had the procedure and originally began therapy 3 days post op in [place] and had continued with PT there until a few weeks ago before traveling to [place] indefinitely. Patient states that she currently lives with her brother on the second floor. States that going down stairs is one her difficulties. States that it feels weak and the inferior area of the knee feels tight.   States she begins descending with step by step descent but improves to alternating step as she approaches the end. She does report that she has no issues with climbing stairs. Patient also reports that she attempts to walk with her friends, but states her leg fatigues quickly and she needs to take multiple breaks to rest.  Also states that on her walks she noticed her endurance has decreased because she has not walked as far as she used to. Pain:  Best: 3  Worst:0  Current: 0    AGGS: When patient walks for a prolonged period of time, standing or sitting for a prolonged period, and transferring from sitting to standing after she had been sitting for a prolonged period. Relieved:  Patient is able to relieve her symptoms with resting the area and applying ice multiple times a day. GOALS: Patients goals include being able to go down steps without pain, and be able to walk with her friends without fatiguing quickly and inquiring pain. 07/10/23  Pt comes to therapy denying any negative changes in status as well as denying any pain. Reports that within the last 2 weeks she has seen a major improvement with descending stairs. States she has not experienced any pain with the activity and she has not been having any adverse reactions with walking for prolonged distances.     AGGS: n/a    GOALS: n/a  Quality of life: good    Pain  Current pain ratin  At best pain ratin  At worst pain ratin  Quality: discomfort  Relieving factors: ice and rest  Aggravating factors: walking, standing and sitting  Progression: improved    Social Support  Steps to enter house: yes  5  Stairs in house: yes   11  Lives in: multiple-level home    Patient Goals  Patient goals for therapy: increased strength, decreased pain and return to sport/leisure activities          Objective     Active Range of Motion     Right Knee   Flexion: 125 degrees   Extension: 0 degrees     Passive Range of Motion     Right Knee   Flexion: 130 degrees Strength/Myotome Testing     Left Hip   Planes of Motion   Flexion: 4    Right Hip   Planes of Motion   Flexion: 4-  Adduction: 5  External rotation: 4+  Internal rotation: 4+    Left Knee   Flexion: 4-  Extension: 5    Right Knee   Flexion: 4+  Extension: 4+    Tests     Additional Tests Details  06/06/23   Squat x5: no impairment  SLS: no impairment  Heel Raise x3: no impairment      07/10/23  Squat - WFL  SLS - WFL  Gait - unremarkable              Precautions: R TKA    Daily Treatment Diary    Date 6/6 6/12 6/14 6/19 6/21 6/26 6/28 7/3 7/5 7/10   FOTO IE         perf 80   Re-Eval IE 6/14           Auth visit # 1               Manuals                                                        Neuro Re-Ed                                                                                                Ther Ex    Bridges GTB 5" 2x10 GTB 5" 2x10 +add 2x10 GTB 5" 2x10 +add 2x10 BTB 5" 2x10  +add  2x10 BTB 5" 2x10  +add  2x10 Purple 5" 2x10 +add  2x10 Purple 5" 2x10 +add  2x10 Purple 5" 2x10 +add  2x10 Purple 5" 2x10 +add  2x10 Purple 5" 2x10 +add  2x10   Clamshells        Purple 5" 2x10 Purple 5" 2x10 Purple 5" 2x10   SLR 2# 2x10 2# 5" 2x10 2# 5" 2x10 2# 5" 2x10 3# 5" 2x10 3# 5" 2x10 3# 5" 2x10      Wall ball squats Red GTB around knees 2x10 5" descent [employer] 2x10 5" descent [employer] 2x10 5" descent [employer] 2x10 5" descent [employer] 2x10 5" descent [employer] 2x10 5" descent [employer] 2x10 5" descent [employer] 3x10 5" descent Red [employer] 3x10 5" descent Red [employer] 3x10 5" descent   Hip hikes       0R x10 b/l      Step downs 0R heel tap 2x10  0R heel tap 2x10  0R heel tap 2x10 0R heel tap 2x10 0R heel tap 2x10 1R heel tap 2x10 0R 2x10 0R 3x10 blue bad for cueing 0R 3x10 blue band for cueing 1R 2x10   SA Leg press nv b/l up- rle only down x15 nv 22# 2x10 44# 2x10 66# 2x10 66# 2x10 22# 2x10 2 up 1 down 44# 2x10 2 up 1 down 66# 2x10   Slider lunges      x8 3 way x5 ea      Side steps           BTB 16 ft 3 laps   Step Ups 1R blue band used for cueing 3x10 1R 2x10   SLS nv  30" x3 30"x3 30"x3 30"x2  30"x1 blue Blue pad 30" x2 Blue pad 30" x2  Blue pad 30" x2    Prone quad stretch w/ strap  30" x3 30"x3 30"x3 30"x3        Supine hamstring stretch w/ strap  30"x3 30"x3 30"x3 30"x3 30"x3 30"x3      SA leg ext/ curls nv 2x10 ea 22#/33# 2x10 ea 22#/33# 2x10 ea 22#/33# 2x10 ea 33# 2x10 ea 33# 2x10 ea 33# 2x10 ea     Ther Activity    Bike 10 min 10' 10' 10' 10'  10' 10'  10'   TM      10'   10'    Gait Training                              Modalities    CP  8'   10' post  10' post                        Access Code: [email or web address]  URL: [email or web address]  Date: 07/10/2023  Prepared by: Isac Nixon    Exercises  - Hamstring Curl with Weight Machine  - 2 x weekly - 3 sets - 10 reps - 5 hold  - Knee Extension with Weight Machine  - 2 x weekly - 3 sets - 10 reps - 5 hold  - Full Leg Press  - 2 x weekly - 3 sets - 10 reps - 5 hold  - Step Up  - 2 x weekly - 3 sets - 10 reps - 5 hold  - Forward Step Down  - 2 x weekly - 3 sets - 10 reps - 5 hold  - Side Stepping with Resistance at Ankles  - 2 x weekly - 3 sets - 10 reps - 5 hold  - Wall Quarter Squat with Swiss Ball  - 2 x weekly - 3 sets - 10 reps - 5 hold      Patient supervised through session by RODY Jernigan, with direct supervision by TIP PiedraT. Attestation signed by Isac Nixon PT at 7/10/2023  1:18 PM:  I supervised the visit. We discussed the case to ensure appropriate continuation and progression of care and I reviewed the documentation.

## 2023-07-12 ENCOUNTER — APPOINTMENT (OUTPATIENT)
Dept: PHYSICAL THERAPY | Facility: REHABILITATION | Age: 68
End: 2023-07-12
Payer: COMMERCIAL

## 2023-07-17 ENCOUNTER — APPOINTMENT (OUTPATIENT)
Dept: PHYSICAL THERAPY | Facility: REHABILITATION | Age: 68
End: 2023-07-17
Payer: COMMERCIAL

## 2024-09-04 DIAGNOSIS — Z00.6 ENCOUNTER FOR EXAMINATION FOR NORMAL COMPARISON OR CONTROL IN CLINICAL RESEARCH PROGRAM: ICD-10-CM

## 2024-09-06 ENCOUNTER — APPOINTMENT (OUTPATIENT)
Dept: LAB | Facility: IMAGING CENTER | Age: 69
End: 2024-09-06

## 2024-09-06 DIAGNOSIS — Z00.6 ENCOUNTER FOR EXAMINATION FOR NORMAL COMPARISON OR CONTROL IN CLINICAL RESEARCH PROGRAM: ICD-10-CM

## 2024-09-06 PROCEDURE — 36415 COLL VENOUS BLD VENIPUNCTURE: CPT

## 2024-09-17 LAB
APOB+LDLR+PCSK9 GENE MUT ANL BLD/T: NOT DETECTED
BRCA1+BRCA2 DEL+DUP + FULL MUT ANL BLD/T: NOT DETECTED
MLH1+MSH2+MSH6+PMS2 GN DEL+DUP+FUL M: NOT DETECTED